# Patient Record
Sex: MALE | Race: WHITE | Employment: OTHER | URBAN - METROPOLITAN AREA
[De-identification: names, ages, dates, MRNs, and addresses within clinical notes are randomized per-mention and may not be internally consistent; named-entity substitution may affect disease eponyms.]

---

## 2021-08-03 ENCOUNTER — PROCEDURE VISIT (OUTPATIENT)
Dept: SURGERY | Age: 71
End: 2021-08-03
Payer: MEDICARE

## 2021-08-03 VITALS — DIASTOLIC BLOOD PRESSURE: 68 MMHG | SYSTOLIC BLOOD PRESSURE: 126 MMHG | HEART RATE: 76 BPM | TEMPERATURE: 98.8 F

## 2021-08-03 DIAGNOSIS — C44.99 SEBACEOUS CARCINOMA: Primary | ICD-10-CM

## 2021-08-03 DIAGNOSIS — C44.399 OTHER SPECIFIED MALIGNANT NEOPLASM OF SKIN OF OTHER PARTS OF FACE: ICD-10-CM

## 2021-08-03 PROCEDURE — 17311 MOHS 1 STAGE H/N/HF/G: CPT | Performed by: DERMATOLOGY

## 2021-08-03 PROCEDURE — 12051 INTMD RPR FACE/MM 2.5 CM/<: CPT | Performed by: DERMATOLOGY

## 2021-08-03 RX ORDER — CHLORAL HYDRATE 500 MG
CAPSULE ORAL DAILY
COMMUNITY

## 2021-08-03 RX ORDER — BRIMONIDINE TARTRATE 2 MG/ML
1 SOLUTION/ DROPS OPHTHALMIC DAILY
COMMUNITY
Start: 2021-07-06

## 2021-08-03 RX ORDER — ATORVASTATIN CALCIUM 20 MG/1
TABLET, FILM COATED ORAL
COMMUNITY
Start: 2021-02-12

## 2021-08-03 RX ORDER — BUDESONIDE AND FORMOTEROL FUMARATE DIHYDRATE 160; 4.5 UG/1; UG/1
AEROSOL RESPIRATORY (INHALATION)
COMMUNITY
Start: 2020-10-20

## 2021-08-03 RX ORDER — ALBUTEROL SULFATE 90 UG/1
2 AEROSOL, METERED RESPIRATORY (INHALATION) EVERY 6 HOURS PRN
COMMUNITY
Start: 2021-03-01

## 2021-08-03 RX ORDER — ASPIRIN 81 MG/1
81 TABLET ORAL DAILY
COMMUNITY

## 2021-08-03 RX ORDER — CALCIUM CARBONATE 260MG(650)
TABLET,CHEWABLE ORAL DAILY
COMMUNITY

## 2021-08-03 NOTE — PATIENT INSTRUCTIONS
[facility] Office Hours:    Monday-Thursday  7:30 AM-4:30 PM    Friday  9:00 AM-1:00 PM    POST-OPERATIVE CARE FOR DISSOLVING STICHES  Bandage change after 48 hours    During your procedure today, dissolving sutures, or stiches, were used to close the wound. You do not have to have stiches removed. They will dissolve (melt away) on their own. Please follow these instructions to help you recover from your procedure and help your wound heal.    CARING FOR YOUR SURGICAL SITE  The bandage should remain on and completely dry for 48 hours. Do NOT get the bandage wet. 1. After the first 48 hours, gently remove the remaining part of the bandage. It can be helpful to moisten the bandage edges in the shower. Steri strips may still be on the wound. It is ok, they will fall off slowly with the daily bandage changes. 2. Gently clean on and around the wound daily with mild soap and water. Some water is okay, but remember the more water on them, the quicker they dissolve! 3. Dry (pat) the area with a clean Q-tip or gauze. Try to clean off any debris or crust from the area. 4. Apply a layer of Vaseline/ Aquaphor (or Bacitracin if your doctor recommends) to the wound area only. 5. Cut a piece of Telfa (or any non-stick dressing) to fit just over the wound and secure it with paper tape. If the wound is small you may use a Band- Aid. Keep area covered for a total of 1 week(s). If the dressing comes off or if you have questions, or concerns about the dressing, please call the office for instructions! POST OPERATIVE INSTRUCTIONS    1. Activity: Do not lift anything heavier than a gallon of milk for 1 week. Also, avoid strenuous activity such as running, power walking or contact sports. 2. Eating and drinking: Do not drink alcohol for 48 hours after your procedure. Alcohol increases the chances of bleeding. 3. Medicines   -If you have discomfort, take Acetaminophen (Tylenol or Extra Strength Tylenol). Follow the instructions and warning on the bottle. -If your doctor has prescribed you an Aspirin daily, please keep taking it. Do not take extra Aspirin or medicines containing Aspirin (such as [place] and Excedrin) for 48 hours after your procedure. Bleeding: If bleeding occurs, DO NOT remove the bandage. Put firm pressure on the area with gauze for 20 minutes without peeking. If the bleeding continues, apply pressure for another 20 minutes. If the bleeding does not stop after you apply pressure, call us right away. If you can not call, go to the nearest emergency room or urgent care facility. What to expect:  You may have these symptoms. They are normal and should get better with time:  1. Swelling. Swelling usually increases for the first 48 hours after your procedure and then begins to improve. Some soreness and redness around your wound. If we worked close to your eyes (forehead, nose, temple, or upper cheeks) your eyes may become swollen and/ or black and blue. 2. Bruising, which could last 1 week or more. 3. Pink and bumpy appearance to the scar. This may happen a few weeks after your procedure. After 4 weeks, you may gently massage the area each day with facial moisturizer or petroleum jelly (Vaseline or Aquaphor). This will help to smooth the skin and improve the appearance of the scar. The color of your scar will fade over time, but it may be pink for several months after the procedure. The scar may take 6 months to 1 year to reach its final color and appearance. 4. \"Spitting\" suture. Occasionally, an inside suture (stitch) does not completely dissolve. When this happens, (generally 4-8 weeks after surgery), it causes a bump or \"pimple\" to form on the scar. This is easily removed and is not at all serious. It does not mean the skin cancer has returned. Contact us if it happens, but do not be alarmed. Vitamin E oil is NOT necessary. A good moisturizer is just as effective.    Sunscreen

## 2021-08-03 NOTE — PROGRESS NOTES
MOHS PROCEDURE NOTE    PHYSICIAN:  Jeffy Domingo MD    ASSISTANT: Loi Solares RN, Amy Spears RN    REFERRING PROVIDER:  Lilli Hay MD    PREOPERATIVE DIAGNOSIS: sebaceous carcinoma     SPECIFIC MOHS INDICATIONS:  location    AUC SCORIN/9    POSTOPERATIVE DIAGNOSIS: SAME    LOCATION: Right central forehead    OPERATIVE PROCEDURE:  MOHS MICROGRAPHIC SURGERY    RECONSTRUCTION OF DEFECT: Intermediate layered closure    PREOPERATIVE SIZE: 6 x 6 MM    DEFECT SIZE: 10 x 10 MM    LENGTH OF REPAIRED WOUND/SIZE OF FLAP/SIZE OF GRAFT:  24 MM    ANESTHESIA:  4mL 1% lidocaine with epinephrine 1:100,000 buffered. EBL:  MINIMAL    DURATION OF PROCEDURE:  1 HOUR    POSTOPERATIVE OBSERVATION: 1 HOUR    SPECIMENS:  SEE MOHS MAP    COMPLICATIONS:  NONE    DESCRIPTION OF PROCEDURE:  The patient was given a mirror, as appropriate, and the biopsy site was identified, marked with a surgical marking pen, and verified by the patient. Options for treatment were discussed and the patient was informed that Mohs surgery was the selected treatment based on its lower recurrence rate, given the features listed above, as compared to other treatment modalities such as excision, radiation, or curettage, and agreed with this treatment plan. Risks and benefits including bruising, swelling, bleeding, infection, nerve injury, recurrence, and scarring were discussed with the patient prior to the procedure and a written consent detailing these and other risks was reviewed with the patient and signed. There was a time out for person and procedure verification. The surgical site was prepped with an antiseptic solution. Application of an antiseptic solution was repeated before each surgical stage. Stage I:  The clinically-apparent tumor was carefully defined and debulked, determining the edge of the surgical excision.     A thin layer of tumor-laden tissue was excised with a narrow margin of normal-appearing skin, using the technique of Mohs. A map was prepared to correspond to the area of skin from which it was excised. Hemostasis was achieved using electrosurgery. The wound was bandaged. The tissue was prepared for the cryostat and sectioned. 1 section(s) prepared. Each section was coded, cut, and stained for microscopic examination. The entire base and margins of the excised piece of tissue were examined by the surgeon. The tissue was examined to the level of subcutaneous fat. No tumor was identified at the peripheral margins of stage I of microscopically controlled surgery. DEFECT MANAGEMENT:    REPAIR DESCRIPTION:  Various closure modalities were discussed with the patient, and it was decided that an intermediate layered repair would best preserve normal anatomic and functional relationships. Additional risk of wound dehiscence was discussed. The area was anesthetized with 1% lidocaine with epinephrine 1:100,000 buffered, was given a sterile prep using Chlorhexidine gluconate 4% solution and draped in the usual sterile fashion. Recreation and enlargement of the wound was performed by excising cones of tissue via the triangulation technique. The final incision lines were placed with respect for the patient's natural skin tension lines in a linear configuration to avoid functional and aesthetic distortion of adjacent free margins. Following minimal undermining, meticulous hemostasis was obtained with spot monopolar electrocoagulation. Subcutaneous dead space and dermis were closed using 5-0 Vicryl buried subcutaneous interrupted suture and the epidermis was approximated with 5-0 Fast absorbing gut running epidermal sutures. WOUND COVERAGE:  The wound was cleaned with normal saline solution, dried off, Aquaphor ointment was applied, and the wound was covered. A dressing was applied for stabilization and light pressure.   The patient was given detailed oral and written instructions on postoperative care. There were no complications. The patient left the Unit in good medical condition. FOLLOW-UP:  As dissolving sutures were placed, the patient was asked to return if any questions or concerns arose, but otherwise will return to see general dermatology per their instructions. Pt with sister with colon polyps, pt had recent colonoscopy with normal findings.

## 2021-08-04 ENCOUNTER — TELEPHONE (OUTPATIENT)
Dept: SURGERY | Age: 71
End: 2021-08-04

## 2021-08-04 NOTE — TELEPHONE ENCOUNTER
The patient was in the office on 8/3/21 for Mohs located on the Right Central Forehead with [facility] repair. The patient tolerated the procedure well and left the office in good condition. Pain level on post-operative day 1:  none and level of pain (1-10, 10 severe), is taking tylenol intermittently for pain    Any bleeding episode that required pressure to be held, bandage change or a call to the office or MD?  no     Any other issues?:  no    A post-operative telephone call was placed at 8/4/2021 at 3:27 PM   in order to check on the patient's recovery process. The patient reported doing well and had no complaints other than those listed above, if any. All of the patient's questions were answered.

## 2022-02-18 RX ORDER — LATANOPROST 50 UG/ML
1 SOLUTION/ DROPS OPHTHALMIC NIGHTLY
COMMUNITY

## 2022-02-18 NOTE — PROGRESS NOTES
Obstructive Sleep Apnea (MOIZ) Screening     Patient:  Radames Song    YOB: 1950      Medical Record #:  [id number]                     Date:  2/18/2022     1. Are you a loud and/or regular snorer? []  Yes       [] No    2. Have you been observed to gasp or stop breathing during sleep? []  Yes       [] No    3. Do you feel tired or groggy upon awakening or do you awaken with a headache?           []  Yes       [] No    4. Are you often tired or fatigued during the wake time hours? []  Yes       [] No    5. Do you fall asleep sitting, reading, watching TV or driving? []  Yes       [] No    6. Do you often have problems with memory or concentration? []  Yes       [] No    **If patient's score is ? 3 they are considered high risk for MOIZ. An Anesthesia provider will evaluate the patient and develop a plan of care the day of surgery. Note:  If the patient's BMI is more than 35 kg m¯² , has neck circumference > 40 cm, and/or high blood pressure the risk is greater (© American Sleep Apnea Association, 2006).

## 2022-02-23 ENCOUNTER — HOSPITAL ENCOUNTER (OUTPATIENT)
Age: 72
Setting detail: OUTPATIENT SURGERY
Discharge: HOME | End: 2022-02-23
Attending: INTERNAL MEDICINE | Admitting: INTERNAL MEDICINE
Payer: MEDICARE

## 2022-02-23 ENCOUNTER — ANESTHESIA EVENT (OUTPATIENT)
Dept: ENDOSCOPY | Age: 72
End: 2022-02-23
Payer: MEDICARE

## 2022-02-23 ENCOUNTER — ANESTHESIA (OUTPATIENT)
Dept: ENDOSCOPY | Age: 72
End: 2022-02-23
Payer: MEDICARE

## 2022-02-23 VITALS
WEIGHT: 270 LBS | OXYGEN SATURATION: 96 % | DIASTOLIC BLOOD PRESSURE: 41 MMHG | SYSTOLIC BLOOD PRESSURE: 105 MMHG | TEMPERATURE: 98.5 F | RESPIRATION RATE: 16 BRPM | HEART RATE: 75 BPM | BODY MASS INDEX: 42.38 KG/M2 | HEIGHT: 67 IN

## 2022-02-23 VITALS — OXYGEN SATURATION: 89 % | DIASTOLIC BLOOD PRESSURE: 45 MMHG | SYSTOLIC BLOOD PRESSURE: 91 MMHG

## 2022-02-23 PROCEDURE — 3700000000 HC ANESTHESIA ATTENDED CARE: Performed by: INTERNAL MEDICINE

## 2022-02-23 PROCEDURE — 2580000003 HC RX 258: Performed by: ANESTHESIOLOGY

## 2022-02-23 PROCEDURE — 6360000002 HC RX W HCPCS: Performed by: NURSE ANESTHETIST, CERTIFIED REGISTERED

## 2022-02-23 PROCEDURE — 7100000011 HC PHASE II RECOVERY - ADDTL 15 MIN: Performed by: INTERNAL MEDICINE

## 2022-02-23 PROCEDURE — 2500000003 HC RX 250 WO HCPCS: Performed by: NURSE ANESTHETIST, CERTIFIED REGISTERED

## 2022-02-23 PROCEDURE — 2709999900 HC NON-CHARGEABLE SUPPLY: Performed by: INTERNAL MEDICINE

## 2022-02-23 PROCEDURE — 7100000010 HC PHASE II RECOVERY - FIRST 15 MIN: Performed by: INTERNAL MEDICINE

## 2022-02-23 PROCEDURE — 2580000003 HC RX 258: Performed by: INTERNAL MEDICINE

## 2022-02-23 PROCEDURE — 3700000001 HC ADD 15 MINUTES (ANESTHESIA): Performed by: INTERNAL MEDICINE

## 2022-02-23 PROCEDURE — 3609017100 HC EGD: Performed by: INTERNAL MEDICINE

## 2022-02-23 RX ORDER — SODIUM CHLORIDE 9 MG/ML
25 INJECTION, SOLUTION INTRAVENOUS PRN
Status: DISCONTINUED | OUTPATIENT
Start: 2022-02-23 | End: 2022-02-23 | Stop reason: HOSPADM

## 2022-02-23 RX ORDER — SODIUM CHLORIDE 0.9 % (FLUSH) 0.9 %
5-40 SYRINGE (ML) INJECTION PRN
Status: DISCONTINUED | OUTPATIENT
Start: 2022-02-23 | End: 2022-02-23 | Stop reason: HOSPADM

## 2022-02-23 RX ORDER — LIDOCAINE HYDROCHLORIDE 10 MG/ML
0.1 INJECTION, SOLUTION EPIDURAL; INFILTRATION; INTRACAUDAL; PERINEURAL
Status: DISCONTINUED | OUTPATIENT
Start: 2022-02-23 | End: 2022-02-23 | Stop reason: HOSPADM

## 2022-02-23 RX ORDER — SODIUM CHLORIDE 0.9 % (FLUSH) 0.9 %
5-40 SYRINGE (ML) INJECTION EVERY 12 HOURS SCHEDULED
Status: DISCONTINUED | OUTPATIENT
Start: 2022-02-23 | End: 2022-02-23 | Stop reason: HOSPADM

## 2022-02-23 RX ORDER — LIDOCAINE HYDROCHLORIDE 20 MG/ML
INJECTION, SOLUTION EPIDURAL; INFILTRATION; INTRACAUDAL; PERINEURAL PRN
Status: DISCONTINUED | OUTPATIENT
Start: 2022-02-23 | End: 2022-02-23 | Stop reason: SDUPTHER

## 2022-02-23 RX ORDER — PROPOFOL 10 MG/ML
INJECTION, EMULSION INTRAVENOUS PRN
Status: DISCONTINUED | OUTPATIENT
Start: 2022-02-23 | End: 2022-02-23 | Stop reason: SDUPTHER

## 2022-02-23 RX ORDER — SODIUM CHLORIDE, SODIUM LACTATE, POTASSIUM CHLORIDE, CALCIUM CHLORIDE 600; 310; 30; 20 MG/100ML; MG/100ML; MG/100ML; MG/100ML
INJECTION, SOLUTION INTRAVENOUS ONCE
Status: COMPLETED | OUTPATIENT
Start: 2022-02-23 | End: 2022-02-23

## 2022-02-23 RX ORDER — LIDOCAINE HYDROCHLORIDE 10 MG/ML
0.3 INJECTION, SOLUTION EPIDURAL; INFILTRATION; INTRACAUDAL; PERINEURAL
Status: DISCONTINUED | OUTPATIENT
Start: 2022-02-23 | End: 2022-02-23 | Stop reason: HOSPADM

## 2022-02-23 RX ORDER — PHENYLEPHRINE HCL IN 0.9% NACL 1 MG/10 ML
SYRINGE (ML) INTRAVENOUS PRN
Status: DISCONTINUED | OUTPATIENT
Start: 2022-02-23 | End: 2022-02-23 | Stop reason: SDUPTHER

## 2022-02-23 RX ORDER — SODIUM CHLORIDE, SODIUM LACTATE, POTASSIUM CHLORIDE, CALCIUM CHLORIDE 600; 310; 30; 20 MG/100ML; MG/100ML; MG/100ML; MG/100ML
INJECTION, SOLUTION INTRAVENOUS CONTINUOUS
Status: DISCONTINUED | OUTPATIENT
Start: 2022-02-23 | End: 2022-02-23 | Stop reason: HOSPADM

## 2022-02-23 RX ADMIN — PROPOFOL 30 MG: 10 INJECTION, EMULSION INTRAVENOUS at 12:27

## 2022-02-23 RX ADMIN — SODIUM CHLORIDE, POTASSIUM CHLORIDE, SODIUM LACTATE AND CALCIUM CHLORIDE: 600; 310; 30; 20 INJECTION, SOLUTION INTRAVENOUS at 11:48

## 2022-02-23 RX ADMIN — PROPOFOL 70 MG: 10 INJECTION, EMULSION INTRAVENOUS at 12:22

## 2022-02-23 RX ADMIN — Medication 100 MCG: at 12:29

## 2022-02-23 RX ADMIN — SODIUM CHLORIDE, SODIUM LACTATE, POTASSIUM CHLORIDE, AND CALCIUM CHLORIDE: .6; .31; .03; .02 INJECTION, SOLUTION INTRAVENOUS at 12:16

## 2022-02-23 RX ADMIN — LIDOCAINE HYDROCHLORIDE 60 MG: 20 INJECTION, SOLUTION EPIDURAL; INFILTRATION; INTRACAUDAL; PERINEURAL at 12:22

## 2022-02-23 ASSESSMENT — PAIN - FUNCTIONAL ASSESSMENT
PAIN_FUNCTIONAL_ASSESSMENT: 0-10
PAIN_FUNCTIONAL_ASSESSMENT: PREVENTS OR INTERFERES WITH MANY ACTIVE NOT PASSIVE ACTIVITIES

## 2022-02-23 NOTE — H&P
[address]   Pre-operative History and Physical    Patient: Haris Rubio  : 1950  Acct#:     HISTORY OF PRESENT ILLNESS:    The patient is a 66 y.o. male who presents for black stools with hx of peptic ulcer history of significant lung disease on O2. We discussed the increased risk, will use POM mask     Indications: black stools     Past Medical History:        Diagnosis Date    Acid reflux     Arthritis     Asthma     COPD (chronic obstructive pulmonary disease) (HCC)     Hypertension     Peptic ulcer     Scarring of lung     2/3 right lung scarred close    Sleep apnea     uses CPAP      Past Surgical History:        Procedure Laterality Date    APPENDECTOMY      CHOLECYSTECTOMY      GASTRIC BYPASS SURGERY      SPLENECTOMY        Medications Prior to Admission:   No current facility-administered medications on file prior to encounter. Current Outpatient Medications on File Prior to Encounter   Medication Sig Dispense Refill    latanoprost (XALATAN) 0.005 % ophthalmic solution Place 1 drop into both eyes nightly      aspirin 81 MG EC tablet Take 81 mg by mouth daily      Magnesium Citrate 100 MG TABS Take by mouth daily       Omega-3 Fatty Acids (FISH OIL) 1000 MG CAPS Take by mouth daily      albuterol sulfate  (90 Base) MCG/ACT inhaler Inhale 2 puffs into the lungs every 6 hours as needed      atorvastatin (LIPITOR) 20 MG tablet TAKE ONE TABLET BY MOUTH DAILY      brimonidine (ALPHAGAN) 0.2 % ophthalmic solution Place 1 drop into both eyes daily       budesonide-formoterol (SYMBICORT) 160-4.5 MCG/ACT AERO INHALE TWO PUFFS BY MOUTH TWICE A DAY      omeprazole (PRILOSEC) 10 MG capsule Take 10 mg by mouth daily.  cetirizine (ZYRTEC) 10 MG tablet Take 10 mg by mouth daily.  zolpidem (AMBIEN) 10 MG tablet Take 10 mg by mouth nightly as needed.  furosemide (LASIX) 20 MG tablet Take 20 mg by mouth daily.         potassium chloride (KLOR-CON) 10 MEQ CR tablet Take 10 mEq by mouth daily.  amlodipine (NORVASC) 5 MG tablet Take 5 mg by mouth daily.  oxycodone-acetaminophen (PERCOCET) 5-325 MG per tablet Take 2 tablets by mouth every 6 hours as needed. Allergies:  Dairycare [lactase-lactobacillus], Ciprofloxacin, Sudafed [pseudoephedrine hcl], and Sulfa antibiotics    Social History:   Social History     Socioeconomic History    Marital status:      Spouse name: Not on file    Number of children: Not on file    Years of education: Not on file    Highest education level: Not on file   Occupational History    Not on file   Tobacco Use    Smoking status: Former Smoker     Types: Pipe     Quit date:      Years since quittin.1    Smokeless tobacco: Never Used   Substance and Sexual Activity    Alcohol use: Not Currently    Drug use: Never    Sexual activity: Not on file   Other Topics Concern    Not on file   Social History Narrative    Not on file     Social Determinants of Health     Financial Resource Strain:     Difficulty of Paying Living Expenses: Not on file   Food Insecurity:     Worried About [address] in the Last Year: Not on file    Fe of Food in the Last Year: Not on file   Transportation Needs:     Lack of Transportation (Medical): Not on file    Lack of Transportation (Non-Medical):  Not on file   Physical Activity:     Days of Exercise per Week: Not on file    Minutes of Exercise per Session: Not on file   Stress:     Feeling of Stress : Not on file   Social Connections:     Frequency of Communication with Friends and Family: Not on file    Frequency of Social Gatherings with Friends and Family: Not on file    Attends Evangelical Services: Not on file    Active Member of Clubs or Organizations: Not on file    Attends Club or Organization Meetings: Not on file    Marital Status: Not on file   Intimate Partner Violence:     Fear of Current or Ex-Partner: Not on file   Alicia

## 2022-02-23 NOTE — ANESTHESIA POSTPROCEDURE EVALUATION
Department of Anesthesiology  Postprocedure Note    Patient: Haris Rubio  MRN: [id number]  YOB: 1950  Date of evaluation: 2/23/2022  Time:  1:10 PM     Procedure Summary     Date: 02/23/22 [facility] [address]    Anesthesia Start: 1216 Anesthesia Stop: 1219    Procedure: ESOPHAGOGASTRODUODENOSCOPY -SLEEP APNEA (N/A ) Diagnosis:       Melena      (MELENA)    Surgeons: Shannon St MD Responsible Provider: Alba Merlin, MD    Anesthesia Type: general ASA Status: 3          Anesthesia Type: general    Toni Phase I: Toni Score: 10    Toni Phase II: Toni Score: 6    Last vitals: Reviewed and per EMR flowsheets.      Vitals:    02/23/22 1238 02/23/22 1241 02/23/22 1246 02/23/22 1300   BP: (!) 105/46 (!) 104/47 (!) 101/43 (!) 105/41   Pulse: 89 78 79 75   Resp: 16 16 16 16   Temp:       TempSrc:       SpO2: 93% 93% 96% 96%   Weight:       Height:         Anesthesia Post Evaluation    Patient location during evaluation: bedside  Patient participation: complete - patient participated  Level of consciousness: awake and alert  Airway patency: patent  Nausea & Vomiting: no nausea  Complications: no  Cardiovascular status: hemodynamically stable  Respiratory status: acceptable  Hydration status: euvolemic

## 2022-02-23 NOTE — ANESTHESIA PRE PROCEDURE
by mouth every 6 hours as needed. Historical Provider, MD       Current medications:    Current Facility-Administered Medications   Medication Dose Route Frequency Provider Last Rate Last Admin    lactated ringers infusion   IntraVENous Once Yosef Steele MD        lidocaine PF 1 % injection 0.1 mL  0.1 mL IntraDERmal Once PRN Yosef Steele MD        lidocaine PF 1 % injection 0.3 mL  0.3 mL IntraDERmal Once PRN Marquise Bundy MD        lactated ringers infusion   IntraVENous Continuous Marquise Bundy MD        sodium chloride flush 0.9 % injection 5-40 mL  5-40 mL IntraVENous 2 times per day Marquise Bundy MD        sodium chloride flush 0.9 % injection 5-40 mL  5-40 mL IntraVENous PRN Marquise Bundy MD        0.9 % sodium chloride infusion  25 mL IntraVENous PRN Marquise Bundy MD         Current Outpatient Medications   Medication Sig Dispense Refill    latanoprost (XALATAN) 0.005 % ophthalmic solution Place 1 drop into both eyes nightly      aspirin 81 MG EC tablet Take 81 mg by mouth daily      Magnesium Citrate 100 MG TABS Take by mouth daily       Omega-3 Fatty Acids (FISH OIL) 1000 MG CAPS Take by mouth daily      albuterol sulfate  (90 Base) MCG/ACT inhaler Inhale 2 puffs into the lungs every 6 hours as needed      atorvastatin (LIPITOR) 20 MG tablet TAKE ONE TABLET BY MOUTH DAILY      brimonidine (ALPHAGAN) 0.2 % ophthalmic solution Place 1 drop into both eyes daily       budesonide-formoterol (SYMBICORT) 160-4.5 MCG/ACT AERO INHALE TWO PUFFS BY MOUTH TWICE A DAY      omeprazole (PRILOSEC) 10 MG capsule Take 10 mg by mouth daily.  cetirizine (ZYRTEC) 10 MG tablet Take 10 mg by mouth daily.  zolpidem (AMBIEN) 10 MG tablet Take 10 mg by mouth nightly as needed.  furosemide (LASIX) 20 MG tablet Take 20 mg by mouth daily.  potassium chloride (KLOR-CON) 10 MEQ CR tablet Take 10 mEq by mouth daily.         amlodipine (NORVASC) 5 MG tablet Take 5 mg by mouth daily.  oxycodone-acetaminophen (PERCOCET) 5-325 MG per tablet Take 2 tablets by mouth every 6 hours as needed. Allergies: Allergies   Allergen Reactions    Dairycare [Lactase-Lactobacillus]     Ciprofloxacin Rash    Sudafed [Pseudoephedrine Hcl] Rash    Sulfa Antibiotics Rash       Problem List:  There is no problem list on file for this patient. Past Medical History:        Diagnosis Date    Acid reflux     Arthritis     Asthma     COPD (chronic obstructive pulmonary disease) (HCC)     Hypertension     Peptic ulcer     Scarring of lung     2/3 right lung scarred close    Sleep apnea     uses CPAP       Past Surgical History:        Procedure Laterality Date    APPENDECTOMY      CHOLECYSTECTOMY      GASTRIC BYPASS SURGERY      SPLENECTOMY         Social History:    Social History     Tobacco Use    Smoking status: Former Smoker     Types: Pipe     Quit date:      Years since quittin.1    Smokeless tobacco: Never Used   Substance Use Topics    Alcohol use: Not Currently                                Counseling given: Not Answered      Vital Signs (Current):   Vitals:    22 1456   Weight: 270 lb (122.5 kg)   Height: 5' 7\" (1.702 m)                                              BP Readings from Last 3 Encounters:   21 126/68   11 120/70       NPO Status:                                                                                 BMI:   Wt Readings from Last 3 Encounters:   11 274 lb (124.3 kg)     Body mass index is 42.29 kg/m². CBC: No results found for: WBC, RBC, HGB, HCT, MCV, RDW, PLT    CMP: No results found for: NA, K, CL, CO2, BUN, CREATININE, GFRAA, AGRATIO, LABGLOM, GLUCOSE, GLU, PROT, CALCIUM, BILITOT, ALKPHOS, AST, ALT    POC Tests: No results for input(s): POCGLU, POCNA, POCK, POCCL, POCBUN, POCHEMO, POCHCT in the last 72 hours. Coags: No results found for: PROTIME, INR, APTT    HCG (If Applicable):  No results found for: PREGTESTUR, PREGSERUM, HCG, HCGQUANT     ABGs: No results found for: PHART, PO2ART, EAM7UXY, IZN8XRX, BEART, O1LPJLAM     Type & Screen (If Applicable):  No results found for: LABABO, LABRH    Drug/Infectious Status (If Applicable):  No results found for: HIV, HEPCAB    COVID-19 Screening (If Applicable): No results found for: COVID19        Anesthesia Evaluation  Patient summary reviewed and Nursing notes reviewed no history of anesthetic complications:   Airway: Mallampati: III     Neck ROM: full   Dental:          Pulmonary:Negative Pulmonary ROS and normal exam    (+) COPD:  sleep apnea:  asthma:                           ROS comment: Lung scaring post pneumonia   Cardiovascular:Negative CV ROS    (+) hypertension:,                   Neuro/Psych:   Negative Neuro/Psych ROS              GI/Hepatic/Renal: Neg GI/Hepatic/Renal ROS  (+) GERD: well controlled, PUD,      (-) hiatal hernia       Endo/Other: Negative Endo/Other ROS   (+) : arthritis:., .                 Abdominal:             Vascular: Other Findings:           Anesthesia Plan      general     ASA 3     (I discussed with the patient the risks and benefits of PIV, general anesthesia, IV Narcotics, PACU. All questions were answered the patient agrees with the plan and wishes to proceed.  )  Induction: intravenous. Pre-Operative Diagnosis: Melena [K92.1]    66 y.o.   BMI:  Body mass index is 42.29 kg/m².      Vitals:    22 1456 22 1136   BP:  105/60   Pulse:  67   Resp:  20   Temp:  98.5 °F (36.9 °C)   TempSrc:  Temporal   SpO2:  100%   Weight: 270 lb (122.5 kg)    Height: 5' 7\" (1.702 m)        Allergies   Allergen Reactions    Dairycare [Lactase-Lactobacillus]     Ciprofloxacin Rash    Sudafed [Pseudoephedrine Hcl] Rash    Sulfa Antibiotics Rash       Social History     Tobacco Use    Smoking status: Former Smoker     Types: Pipe     Quit date:      Years since quittin.1    Smokeless tobacco: Never Used   Substance Use Topics    Alcohol use: Not Currently       LABS:    CBC  No results found for: WBC, HGB, HCT, PLT  RENAL  No results found for: NA, K, CL, CO2, BUN, CREATININE, GLUCOSE  COAGS  No results found for: PROTIME, INR, APTT      Aparna Greene MD   2/23/2022

## 2022-02-23 NOTE — PROCEDURES
Endoscopy Note    Patient: Percy Valadez  : 1950      Procedure: Esophagogastroduodenoscopy     Date:  2022     Surgeon:  Maranda Herrera MD     Referring Physician:  Noni Pike MD      History:  The patient is a 66 y.o. male who presents for black stools with hx of peptic ulcer history of significant lung disease on O2. We discussed the increased risk, will use POM mask        INDICATION: Black stools     ASA: 4  SEDATION: MAC         Operative Surgeon: Maranda Herrera MD  Scope Type: Gastroscope      Preoperative Diagnosis: Black stools  Postoperative Diagnosis: History of gastric bypass    Procedure Performed: EGD    Procedure Details:    With the patient in left lateral position the endoscope was passed through the hypopharynx into the esophagus. The scope as then passed through the esophagus to the small bowel. All visualized portions were carefully inspected. The gastric air was suctioned and the scope as removed. Patient tolerated the procedure well. Findings and maneuvers are discussed below. Complications:  None  Estimated Blood Loss: minimal to none    Post Operative Findings:   Esophagus: Subcu mucosa was normal throughout the entire esophagus  Stomach: Evidence of prior gastric bypass the gastric pouch appeared normal and healthy. No anastomotic ulcers were seen. Small bowel: Small bowel limb was deeply intubated there was no blood or other lesions seen    Plan: Follow-up with primary gastroenterologist no obvious signs of GI blood loss at this time. He is status post gastric bypass. Continue acid suppression for now. Signed By: MD Maranda Potts MD,   [facility]  [phone]    Please note that some or all of this record was generated using voice recognition software. If there are any questions about the content of this document, please contact the author as some errors in translation may have occurred.

## 2023-01-18 ENCOUNTER — TELEPHONE (OUTPATIENT)
Dept: ORTHOPEDIC SURGERY | Age: 73
End: 2023-01-18

## 2023-01-18 NOTE — TELEPHONE ENCOUNTER
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, MOIZ on CPAP, HTN, RBBB, BPH, Iron deficiency anemia, Esophageal reflux, Chronic pain syndrome    Critical Lab Values  - Hemoglobin (g/dL):  Date: 01/17/23 Value 13.4  - Hematocrit(%): Date: 01/17/23  Value 42.3  - HgbA1C:  Date:  Value  - Albumin:  Date: 01/17/23  Value 3.6  - BUN:  Date:  01/17/23 Value 19  - Creatinine:  Date: 01/17/23  Value 0.89    Coronary Artery Disease/HTN/CHF history: Yes  Cardiologist: No  Cardiac clearance necessary:  No  Date of cardiac clearance appt:  Final Cardiac recommendations: On any anticoagulation: Aspirin 81mg QD    Diabetes History:  No  Most recent HgbA1C: 4.9 on 07/19/22  Pulmonary:  COPD/Emphysema/Use of home oxygen: Yes, COPD  Alcohol use: Yes    BMI greater than 40 at time of scheduling: Additional medical concerns:   Additional recommendations for above concerns:  Attended Pre-Hab program:    Anticipated Discharge Disposition:  Patient does not have any help at home and wants to go to rehab unit PO  Who will be with patient at home following discharge:    Equipment patient already has:    Bedroom on first or second floor:    Bathroom on first or second floor:    Weight bearing status:  wbat  Pre-op ambulatory status: painful ambulation  Number of entry steps:    Caregiver assistance:  Patient does not have anyone to help PO    Boby Peoples RN  Date:  01/18/23

## 2023-01-20 RX ORDER — FLUTICASONE PROPIONATE 50 MCG
SPRAY, SUSPENSION (ML) NASAL
COMMUNITY
Start: 2022-12-02

## 2023-01-20 RX ORDER — OXYCODONE AND ACETAMINOPHEN 10; 325 MG/1; MG/1
TABLET ORAL
Status: ON HOLD | COMMUNITY
Start: 2022-11-18 | End: 2023-01-27 | Stop reason: HOSPADM

## 2023-01-20 RX ORDER — IPRATROPIUM BROMIDE 21 UG/1
2 SPRAY, METERED NASAL 3 TIMES DAILY
COMMUNITY
Start: 2022-11-29

## 2023-01-20 RX ORDER — SULINDAC 150 MG/1
TABLET ORAL
COMMUNITY
Start: 2023-01-17

## 2023-01-20 RX ORDER — GABAPENTIN 400 MG/1
CAPSULE ORAL 2 TIMES DAILY
Status: ON HOLD | COMMUNITY
Start: 2022-12-02 | End: 2023-01-27 | Stop reason: SDUPTHER

## 2023-01-20 RX ORDER — CEFADROXIL 500 MG/1
CAPSULE ORAL
COMMUNITY
Start: 2023-01-18

## 2023-01-20 NOTE — PROGRESS NOTES
[facility] PRE-SURGICAL TESTING INSTRUCTIONS                      PRIOR TO PROCEDURE DATE:    1. PLEASE FOLLOW ANY INSTRUCTIONS GIVEN TO YOU PER YOUR SURGEON. 2. Arrange for someone to drive you home and be with you for the first 24 hours after discharge for your safety after your procedure for which you received sedation. Ensure it is someone we can share information with regarding your discharge. NOTE: At this time ONLY 2 ADULTS may accompany you   One person ENCOURAGED to stay at hospital entire time if outpatient surgery      3. You must contact your surgeon for instructions IF:  You are taking any blood thinners, aspirin, anti-inflammatory or vitamins. There is a change in your physical condition such as a cold, fever, rash, cuts, sores, or any other infection, especially near your surgical site. 4. Do not drink alcohol the day before or day of your procedure. Do not use any recreational marijuana at least 24 hours or street drugs (heroin, cocaine) at minimum 5 days prior to your procedure. 5. A Pre-Surgical History and Physical MUST be completed WITHIN 30 DAYS OR LESS prior to your procedure. by your Physician or an Urgent Care        THE DAY OF YOUR PROCEDURE:  1. Follow instructions for ARRIVAL TIME as DIRECTED BY YOUR SURGEON. 2. Enter the MAIN entrance from [employer] and follow the signs to the free Parking [employer] or [employer] (offered free of charge 7 am-5pm). 3. Enter the Main Entrance of the hospital (do not enter from the lower level of the parking garage). Upon entrance, check in with the  at the surgical information desk on your LEFT. Bring your insurance card and photo ID to register      4. DO NOT EAT ANYTHING 8 hours prior to arrival for surgery. You may have up to 8 ounces of water 4 hours prior to your arrival for surgery.    NOTE: ALL Gastric, Bariatric & Bowel surgery patients - you MUST follow your surgeon's instructions regarding eating/ drinking as you will have very specific instructions to follow. If you did not receive these, call your surgeon's office immediately. 5. MEDICATIONS:  Take the following medications with a SMALL sip of water: amlodipine, nexium, symbicort, bring rescue inhaler  Use your usual dose of inhalers the morning of surgery. BRING your rescue inhaler with you to hospital.   Anesthesia does NOT want you to take insulin the morning of surgery. They will control your blood sugar while you are at the hospital. Please contact your ordering physician for instructions regarding your insulin the night before your procedure. If you have an insulin pump, please keep it set on basal rate. Bariatric patient's call your surgeon if on diabetic medications as some may need to be stopped 1 week prior to surgery    6. Do not swallow additional water when brushing teeth. No gum, candy, mints, or ice chips. Refrain from smoking or at least decrease the amount on day of surgery. 7. Morning of surgery:   Take a shower with an antibacterial soap (i.e., Safeguard or Dial) OR your physician may have instructed you to use Hibiclens. Dress in loose, comfortable clothing appropriate for redressing after your procedure. Do not wear jewelry (including body piercings), make-up (especially NO eye make-up), fingernail polish (NO toenail polish if foot/leg surgery), lotion, powders, or metal hairclips. Do not shave or wax for 72 hours prior to procedure near your operative site. Shaving with a razor can irritate your skin and make it easier to develop an infection. On the day of your procedure, any hair that needs to be removed near the surgical site will be 'clipped' by a healthcare worker using a special clipper designed to avoid skin irritation. 8. Dentures, glasses, or contacts will need to be removed before your procedure.  Bring cases for your glasses, contacts, dentures, or hearing aids to protect them while you are in surgery. 9. If you use a CPAP, please bring it with you on the day of your procedure. 10. We recommend that valuable personal belongings such as cash, cell phones, e-tablets, or jewelry, be left at home during your stay. The hospital will not be responsible for valuables that are not secured in the hospital safe. However, if your insurance requires a co-pay, you may want to bring a method of payment, i.e., Check or credit card, if you wish to pay your co-pay the day of surgery. 11. If you are to stay overnight, you may bring a bag with personal items. Please have any large items you may need brought in by your family after your arrival to your hospital room. 12. If you have a Living Will or Durable Power of , please bring a copy on the day of your procedure. How we keep you safe and work to prevent surgical site infections:   1. Health care workers should always check your ID bracelet to verify your name and birth date. You will be asked many times to state your name, date of birth, and allergies. 2. Health care workers should always clean their hands with soap or alcohol gel before providing care to you. It is okay to ask anyone if they cleaned their hands before they touch you. 3. You will be actively involved in verifying the type of procedure you are having and ensuring the correct surgical site. This will be confirmed multiple times prior to your procedure. Do NOT perri your surgery site UNLESS instructed to by your surgeon. 4. When you are in the operating room, your surgical site will be cleansed with a special soap, and in most cases, you will be given an antibiotic before the surgery begins. What to expect AFTER your procedure? 1. Immediately following your procedure, your will be taken to the PACU for the first phase of your recovery.   Your nurse will help you recover from any potential side effects of anesthesia, such as extreme drowsiness, changes in your vital signs or breathing patterns. Nausea, headache, muscle aches, or sore throat may also occur after anesthesia. Your nurse will help you manage these potential side effects. 2. For comfort and safety, arrange to have someone at home with you for the first 24 hours after discharge. 3. You and your family will be given written instructions about your diet, activity, dressing care, medications, and return visits. 4. Once at home, should issues with nausea, pain, or bleeding occur, or should you notice any signs of infection, you should call your surgeon. 5. Always clean your hands before and after caring for your wound. Do not let your family touch your surgery site without cleaning their hands. 6. Narcotic pain medications can cause significant constipation. You may want to add a stool softener to your postoperative medication schedule or speak to your surgeon on how best to manage this SIDE EFFECT. Thank you for allowing us to care for you. We strive to exceed your expectations in the delivery of care and service provided to you and your family. If you need to contact the [place] [id number] staff for any reason, please call us at [phone]    Instructions reviewed with patient during preadmission testing phone interview.   Andressa Pedroza RN.1/20/2023 .3:52 PM      ADDITIONAL EDUCATIONAL INFORMATION REVIEWED PER PHONE WITH YOU AND/OR YOUR FAMILY:  Yes Hibiclens® Bathing Instructions   No Antibacterial Soap

## 2023-01-20 NOTE — PROGRESS NOTES
Place patient label inside box (if no patient label, complete below)  Name:  :  MR#:   Arsh Hawthorne / PROCEDURE  I (we), Felix Lee (Patient Name) authorize DR. Willian Medina (Provider / Grecia Miller) and/or such assistants as may be selected by him/her, to perform the following operation/procedure(s): MINIMALLY INVASIVE LEFT TOTAL KNEE ARTHROPLASTY       Note: If unable to obtain consent prior to an emergent procedure, document the emergent reason in the medical record. This procedure has been explained to my (our) satisfaction and included in the explanation was: The intended benefit, nature, and extent of the procedure to be performed; The significant risks involved and the probability of success; Alternative procedures and methods of treatment; The dangers and probable consequences of such alternatives (including no procedure or treatment); The expected consequences of the procedure on my future health; Whether other qualified individuals would be performing important surgical tasks and/or whether  would be present to advise or support the procedure. I (we) understand that there are other risks of infection and other serious complications in the pre-operative/procedural and postoperative/procedural stages of my (our) care. I (we) have asked all of the questions which I (we) thought were important in deciding whether or not to undergo treatment or diagnosis. These questions have been answered to my (our) satisfaction. I (we) understand that no assurance can be given that the procedure will be a success, and no guarantee or warranty of success has been given to me (us). It has been explained to me (us) that during the course of the operation/procedure, unforeseen conditions may be revealed that necessitate extension of the original procedure(s) or different procedure(s) than those set forth in Paragraph 1.  I (we) authorize and request that the above-named physician, his/her assistants or his/her designees, perform procedures as necessary and desirable if deemed to be in my (our) best interest.     Revised 8/2/2021                                                                          Page 1 of 2       I acknowledge that health care personnel may be observing this procedure for the purpose of medical education or other specified purposes as may be necessary as requested and/or approved by my (our) physician. I (we) consent to the disposal by the hospital Pathologist of the removed tissue, parts or organs in accordance with hospital policy. I do ____ do not ____ consent to the use of a local infiltration pain blocking agent that will be used by my provider/surgical provider to help alleviate pain during my procedure. I do ____ do not ____ consent to an emergent blood transfusion in the case of a life-threatening situation that requires blood components to be administered. This consent is valid for 24 hours from the beginning of the procedure. This patient does ____ or does not ____ currently have a DNR status/order. If DNR order is in place, obtain Addendum to the Surgical Consent for ALL Patients with a DNR Order to address nika-operative status for limited intervention or DNR suspension.      I have read and fully understand the above Consent for Operation/Procedure and that all blanks were completed before I signed the consent.   _____________________________       _____________________      ____/____am/pm  Signature of Patient or legal representative      Printed Name / Relationship            Date / Time   ____________________________       _____________________      ____/____am/pm  Witness to Signature                                    Printed Name                    Date / Time    If patient is unable to sign or is a minor, complete the following)  Patient is a minor, ____ years of age, or unable to sign because:   ______________________________________________________________________________________________    If a phone consent is obtained, consent will be documented by using two health care professionals, each affirming that the consenting party has no questions and gives consent for the procedure discussed with the physician/provider.   _____________________          ____________________       _____/_____am/pm   2nd witness to phone consent        Printed name           Date / Time    Informed Consent:  I have provided the explanation described above in section 1 to the patient and/or legal representative.  I have provided the patient and/or legal representative with an opportunity to ask any questions about the proposed operation/procedure.   ___________________________          ____________________         ____/____am/pm  Provider / Proceduralist                            Printed name            Date / Time  Revised 8/2/2021                                                                      Page 2 of 2

## 2023-01-23 ENCOUNTER — ANESTHESIA EVENT (OUTPATIENT)
Dept: OPERATING ROOM | Age: 73
End: 2023-01-23
Payer: MEDICARE

## 2023-01-24 ENCOUNTER — ANESTHESIA (OUTPATIENT)
Dept: OPERATING ROOM | Age: 73
End: 2023-01-24
Payer: MEDICARE

## 2023-01-24 ENCOUNTER — APPOINTMENT (OUTPATIENT)
Dept: GENERAL RADIOLOGY | Age: 73
End: 2023-01-24
Attending: ORTHOPAEDIC SURGERY
Payer: MEDICARE

## 2023-01-24 ENCOUNTER — HOSPITAL ENCOUNTER (OUTPATIENT)
Age: 73
Setting detail: OBSERVATION
Discharge: NURSING, REHAB OR LONG-TERM CARE | End: 2023-01-27
Attending: ORTHOPAEDIC SURGERY | Admitting: ORTHOPAEDIC SURGERY
Payer: MEDICARE

## 2023-01-24 DIAGNOSIS — M17.12 OSTEOARTHRITIS OF LEFT KNEE, UNSPECIFIED OSTEOARTHRITIS TYPE: Primary | ICD-10-CM

## 2023-01-24 LAB
ABO/RH: NORMAL
ANTIBODY SCREEN: NORMAL
APTT: 24.7 SEC (ref 23–34.3)
EKG ATRIAL RATE: 67 BPM
EKG DIAGNOSIS: NORMAL
EKG P AXIS: 29 DEGREES
EKG P-R INTERVAL: 152 MS
EKG Q-T INTERVAL: 444 MS
EKG QRS DURATION: 148 MS
EKG QTC CALCULATION (BAZETT): 469 MS
EKG R AXIS: -22 DEGREES
EKG T AXIS: 15 DEGREES
EKG VENTRICULAR RATE: 67 BPM
GLUCOSE BLD-MCNC: 97 MG/DL (ref 70–99)
PERFORMED ON: NORMAL

## 2023-01-24 PROCEDURE — 64447 NJX AA&/STRD FEMORAL NRV IMG: CPT | Performed by: ANESTHESIOLOGY

## 2023-01-24 PROCEDURE — 85730 THROMBOPLASTIN TIME PARTIAL: CPT

## 2023-01-24 PROCEDURE — 6360000002 HC RX W HCPCS: Performed by: PHYSICIAN ASSISTANT

## 2023-01-24 PROCEDURE — 6360000002 HC RX W HCPCS: Performed by: NURSE ANESTHETIST, CERTIFIED REGISTERED

## 2023-01-24 PROCEDURE — 3700000000 HC ANESTHESIA ATTENDED CARE: Performed by: ORTHOPAEDIC SURGERY

## 2023-01-24 PROCEDURE — 3600000014 HC SURGERY LEVEL 4 ADDTL 15MIN: Performed by: ORTHOPAEDIC SURGERY

## 2023-01-24 PROCEDURE — 86850 RBC ANTIBODY SCREEN: CPT

## 2023-01-24 PROCEDURE — A4217 STERILE WATER/SALINE, 500 ML: HCPCS | Performed by: ORTHOPAEDIC SURGERY

## 2023-01-24 PROCEDURE — 6360000002 HC RX W HCPCS: Performed by: ANESTHESIOLOGY

## 2023-01-24 PROCEDURE — 6360000002 HC RX W HCPCS: Performed by: ORTHOPAEDIC SURGERY

## 2023-01-24 PROCEDURE — 2580000003 HC RX 258: Performed by: ANESTHESIOLOGY

## 2023-01-24 PROCEDURE — C1713 ANCHOR/SCREW BN/BN,TIS/BN: HCPCS | Performed by: ORTHOPAEDIC SURGERY

## 2023-01-24 PROCEDURE — 62325 NJX INTERLAMINAR CRV/THRC: CPT | Performed by: ANESTHESIOLOGY

## 2023-01-24 PROCEDURE — 6370000000 HC RX 637 (ALT 250 FOR IP): Performed by: PHYSICIAN ASSISTANT

## 2023-01-24 PROCEDURE — 2580000003 HC RX 258: Performed by: ORTHOPAEDIC SURGERY

## 2023-01-24 PROCEDURE — 7100000000 HC PACU RECOVERY - FIRST 15 MIN: Performed by: ORTHOPAEDIC SURGERY

## 2023-01-24 PROCEDURE — 3700000001 HC ADD 15 MINUTES (ANESTHESIA): Performed by: ORTHOPAEDIC SURGERY

## 2023-01-24 PROCEDURE — 2580000003 HC RX 258: Performed by: NURSE ANESTHETIST, CERTIFIED REGISTERED

## 2023-01-24 PROCEDURE — 2580000003 HC RX 258: Performed by: PHYSICIAN ASSISTANT

## 2023-01-24 PROCEDURE — 2709999900 HC NON-CHARGEABLE SUPPLY: Performed by: ORTHOPAEDIC SURGERY

## 2023-01-24 PROCEDURE — 6370000000 HC RX 637 (ALT 250 FOR IP): Performed by: FAMILY MEDICINE

## 2023-01-24 PROCEDURE — 83036 HEMOGLOBIN GLYCOSYLATED A1C: CPT

## 2023-01-24 PROCEDURE — 6370000000 HC RX 637 (ALT 250 FOR IP): Performed by: ORTHOPAEDIC SURGERY

## 2023-01-24 PROCEDURE — 93005 ELECTROCARDIOGRAM TRACING: CPT | Performed by: ORTHOPAEDIC SURGERY

## 2023-01-24 PROCEDURE — 86900 BLOOD TYPING SEROLOGIC ABO: CPT

## 2023-01-24 PROCEDURE — C1776 JOINT DEVICE (IMPLANTABLE): HCPCS | Performed by: ORTHOPAEDIC SURGERY

## 2023-01-24 PROCEDURE — 93010 ELECTROCARDIOGRAM REPORT: CPT | Performed by: INTERNAL MEDICINE

## 2023-01-24 PROCEDURE — G0378 HOSPITAL OBSERVATION PER HR: HCPCS

## 2023-01-24 PROCEDURE — 3600000004 HC SURGERY LEVEL 4 BASE: Performed by: ORTHOPAEDIC SURGERY

## 2023-01-24 PROCEDURE — 2500000003 HC RX 250 WO HCPCS: Performed by: NURSE ANESTHETIST, CERTIFIED REGISTERED

## 2023-01-24 PROCEDURE — 2500000003 HC RX 250 WO HCPCS: Performed by: ORTHOPAEDIC SURGERY

## 2023-01-24 PROCEDURE — 7100000001 HC PACU RECOVERY - ADDTL 15 MIN: Performed by: ORTHOPAEDIC SURGERY

## 2023-01-24 PROCEDURE — 86901 BLOOD TYPING SEROLOGIC RH(D): CPT

## 2023-01-24 PROCEDURE — 73560 X-RAY EXAM OF KNEE 1 OR 2: CPT

## 2023-01-24 PROCEDURE — 2720000010 HC SURG SUPPLY STERILE: Performed by: ORTHOPAEDIC SURGERY

## 2023-01-24 DEVICE — NEXGEN CR FLEX PROLONG ART SURF C-H/56 GRN 10MM: Type: IMPLANTABLE DEVICE | Site: KNEE | Status: FUNCTIONAL

## 2023-01-24 DEVICE — SCREW BNE L48MM CONSTRN CNDYL KNEE HEX HD STEM FOR LEG NXGN: Type: IMPLANTABLE DEVICE | Site: KNEE | Status: FUNCTIONAL

## 2023-01-24 DEVICE — NEXGEN PRECOAT PEG TIB PLATE SZ 6: Type: IMPLANTABLE DEVICE | Site: KNEE | Status: FUNCTIONAL

## 2023-01-24 DEVICE — KNEE K1 TOT HEMI STD CEM IMPL CAPPED K1 ZIM: Type: IMPLANTABLE DEVICE | Site: KNEE | Status: FUNCTIONAL

## 2023-01-24 DEVICE — CEMENT BNE 20ML 41GM FULL DOSE PMMA W/ TOBRA M VISC RADPQ: Type: IMPLANTABLE DEVICE | Site: KNEE | Status: FUNCTIONAL

## 2023-01-24 DEVICE — IMPLANTABLE DEVICE: Type: IMPLANTABLE DEVICE | Site: KNEE | Status: FUNCTIONAL

## 2023-01-24 DEVICE — NEXGEN ALL-POLY PATELLA 35MM: Type: IMPLANTABLE DEVICE | Site: KNEE | Status: FUNCTIONAL

## 2023-01-24 RX ORDER — PANTOPRAZOLE SODIUM 40 MG/1
40 TABLET, DELAYED RELEASE ORAL
Status: DISCONTINUED | OUTPATIENT
Start: 2023-01-25 | End: 2023-01-27 | Stop reason: HOSPADM

## 2023-01-24 RX ORDER — LIDOCAINE HYDROCHLORIDE 20 MG/ML
INJECTION, SOLUTION EPIDURAL; INFILTRATION; INTRACAUDAL; PERINEURAL PRN
Status: DISCONTINUED | OUTPATIENT
Start: 2023-01-24 | End: 2023-01-24 | Stop reason: SDUPTHER

## 2023-01-24 RX ORDER — METHOCARBAMOL 500 MG/1
500 TABLET, FILM COATED ORAL 4 TIMES DAILY
Status: DISCONTINUED | OUTPATIENT
Start: 2023-01-24 | End: 2023-01-27 | Stop reason: HOSPADM

## 2023-01-24 RX ORDER — MAGNESIUM HYDROXIDE 1200 MG/15ML
LIQUID ORAL CONTINUOUS PRN
Status: DISCONTINUED | OUTPATIENT
Start: 2023-01-24 | End: 2023-01-24 | Stop reason: HOSPADM

## 2023-01-24 RX ORDER — ARFORMOTEROL TARTRATE 15 UG/2ML
15 SOLUTION RESPIRATORY (INHALATION) 2 TIMES DAILY
Status: DISCONTINUED | OUTPATIENT
Start: 2023-01-24 | End: 2023-01-27 | Stop reason: HOSPADM

## 2023-01-24 RX ORDER — GLYCOPYRROLATE 0.2 MG/ML
INJECTION INTRAMUSCULAR; INTRAVENOUS PRN
Status: DISCONTINUED | OUTPATIENT
Start: 2023-01-24 | End: 2023-01-24 | Stop reason: SDUPTHER

## 2023-01-24 RX ORDER — ROPIVACAINE HYDROCHLORIDE 5 MG/ML
INJECTION, SOLUTION EPIDURAL; INFILTRATION; PERINEURAL
Status: COMPLETED
Start: 2023-01-24 | End: 2023-01-24

## 2023-01-24 RX ORDER — LABETALOL HYDROCHLORIDE 5 MG/ML
10 INJECTION, SOLUTION INTRAVENOUS
Status: DISCONTINUED | OUTPATIENT
Start: 2023-01-24 | End: 2023-01-24 | Stop reason: HOSPADM

## 2023-01-24 RX ORDER — ONDANSETRON 2 MG/ML
INJECTION INTRAMUSCULAR; INTRAVENOUS PRN
Status: DISCONTINUED | OUTPATIENT
Start: 2023-01-24 | End: 2023-01-24 | Stop reason: SDUPTHER

## 2023-01-24 RX ORDER — FENTANYL CITRATE 50 UG/ML
INJECTION, SOLUTION INTRAMUSCULAR; INTRAVENOUS PRN
Status: DISCONTINUED | OUTPATIENT
Start: 2023-01-24 | End: 2023-01-24 | Stop reason: SDUPTHER

## 2023-01-24 RX ORDER — SODIUM CHLORIDE 9 MG/ML
INJECTION, SOLUTION INTRAVENOUS PRN
Status: DISCONTINUED | OUTPATIENT
Start: 2023-01-24 | End: 2023-01-27 | Stop reason: HOSPADM

## 2023-01-24 RX ORDER — ACETAMINOPHEN 325 MG/1
650 TABLET ORAL EVERY 6 HOURS
Status: DISCONTINUED | OUTPATIENT
Start: 2023-01-24 | End: 2023-01-27 | Stop reason: HOSPADM

## 2023-01-24 RX ORDER — GABAPENTIN 400 MG/1
400 CAPSULE ORAL 2 TIMES DAILY
Status: DISCONTINUED | OUTPATIENT
Start: 2023-01-24 | End: 2023-01-27 | Stop reason: HOSPADM

## 2023-01-24 RX ORDER — MIDAZOLAM HYDROCHLORIDE 1 MG/ML
INJECTION INTRAMUSCULAR; INTRAVENOUS PRN
Status: DISCONTINUED | OUTPATIENT
Start: 2023-01-24 | End: 2023-01-24 | Stop reason: SDUPTHER

## 2023-01-24 RX ORDER — FENTANYL CITRATE 50 UG/ML
INJECTION, SOLUTION INTRAMUSCULAR; INTRAVENOUS
Status: COMPLETED
Start: 2023-01-24 | End: 2023-01-24

## 2023-01-24 RX ORDER — SUCCINYLCHOLINE/SOD CL,ISO/PF 100 MG/5ML
SYRINGE (ML) INTRAVENOUS PRN
Status: DISCONTINUED | OUTPATIENT
Start: 2023-01-24 | End: 2023-01-24 | Stop reason: SDUPTHER

## 2023-01-24 RX ORDER — SODIUM CHLORIDE 9 MG/ML
INJECTION, SOLUTION INTRAVENOUS CONTINUOUS
Status: DISCONTINUED | OUTPATIENT
Start: 2023-01-24 | End: 2023-01-27 | Stop reason: HOSPADM

## 2023-01-24 RX ORDER — SULINDAC 150 MG/1
150 TABLET ORAL 2 TIMES DAILY
Status: DISCONTINUED | OUTPATIENT
Start: 2023-01-24 | End: 2023-01-27 | Stop reason: HOSPADM

## 2023-01-24 RX ORDER — ARFORMOTEROL TARTRATE 15 UG/2ML
15 SOLUTION RESPIRATORY (INHALATION) 2 TIMES DAILY
Status: DISCONTINUED | OUTPATIENT
Start: 2023-01-24 | End: 2023-01-24

## 2023-01-24 RX ORDER — OXYCODONE HYDROCHLORIDE 5 MG/1
5 TABLET ORAL ONCE
Status: COMPLETED | OUTPATIENT
Start: 2023-01-24 | End: 2023-01-24

## 2023-01-24 RX ORDER — SODIUM CHLORIDE, SODIUM LACTATE, POTASSIUM CHLORIDE, CALCIUM CHLORIDE 600; 310; 30; 20 MG/100ML; MG/100ML; MG/100ML; MG/100ML
INJECTION, SOLUTION INTRAVENOUS CONTINUOUS
Status: DISCONTINUED | OUTPATIENT
Start: 2023-01-24 | End: 2023-01-24 | Stop reason: HOSPADM

## 2023-01-24 RX ORDER — ACETAMINOPHEN 500 MG
1000 TABLET ORAL ONCE
Status: COMPLETED | OUTPATIENT
Start: 2023-01-24 | End: 2023-01-24

## 2023-01-24 RX ORDER — FENTANYL CITRATE 50 UG/ML
25 INJECTION, SOLUTION INTRAMUSCULAR; INTRAVENOUS EVERY 5 MIN PRN
Status: DISCONTINUED | OUTPATIENT
Start: 2023-01-24 | End: 2023-01-24 | Stop reason: HOSPADM

## 2023-01-24 RX ORDER — ATORVASTATIN CALCIUM 20 MG/1
20 TABLET, FILM COATED ORAL NIGHTLY
Status: DISCONTINUED | OUTPATIENT
Start: 2023-01-24 | End: 2023-01-27 | Stop reason: HOSPADM

## 2023-01-24 RX ORDER — ONDANSETRON 2 MG/ML
4 INJECTION INTRAMUSCULAR; INTRAVENOUS
Status: DISCONTINUED | OUTPATIENT
Start: 2023-01-24 | End: 2023-01-24 | Stop reason: HOSPADM

## 2023-01-24 RX ORDER — SODIUM CHLORIDE 0.9 % (FLUSH) 0.9 %
5-40 SYRINGE (ML) INJECTION EVERY 12 HOURS SCHEDULED
Status: DISCONTINUED | OUTPATIENT
Start: 2023-01-24 | End: 2023-01-27 | Stop reason: HOSPADM

## 2023-01-24 RX ORDER — MORPHINE SULFATE 2 MG/ML
4 INJECTION, SOLUTION INTRAMUSCULAR; INTRAVENOUS
Status: DISPENSED | OUTPATIENT
Start: 2023-01-24 | End: 2023-01-26

## 2023-01-24 RX ORDER — AMLODIPINE BESYLATE 5 MG/1
5 TABLET ORAL DAILY
Status: DISCONTINUED | OUTPATIENT
Start: 2023-01-24 | End: 2023-01-24

## 2023-01-24 RX ORDER — PROPOFOL 10 MG/ML
INJECTION, EMULSION INTRAVENOUS PRN
Status: DISCONTINUED | OUTPATIENT
Start: 2023-01-24 | End: 2023-01-24 | Stop reason: SDUPTHER

## 2023-01-24 RX ORDER — LANOLIN ALCOHOL/MO/W.PET/CERES
100 CREAM (GRAM) TOPICAL DAILY
Status: DISCONTINUED | OUTPATIENT
Start: 2023-01-24 | End: 2023-01-27 | Stop reason: HOSPADM

## 2023-01-24 RX ORDER — HYDRALAZINE HYDROCHLORIDE 20 MG/ML
10 INJECTION INTRAMUSCULAR; INTRAVENOUS
Status: DISCONTINUED | OUTPATIENT
Start: 2023-01-24 | End: 2023-01-24 | Stop reason: HOSPADM

## 2023-01-24 RX ORDER — MELOXICAM 7.5 MG/1
3.75 TABLET ORAL DAILY
Status: DISCONTINUED | OUTPATIENT
Start: 2023-01-24 | End: 2023-01-24

## 2023-01-24 RX ORDER — IPRATROPIUM BROMIDE 21 UG/1
2 SPRAY, METERED NASAL 3 TIMES DAILY
Status: DISCONTINUED | OUTPATIENT
Start: 2023-01-24 | End: 2023-01-27 | Stop reason: HOSPADM

## 2023-01-24 RX ORDER — BUDESONIDE 0.5 MG/2ML
0.5 INHALANT ORAL 2 TIMES DAILY
Status: DISCONTINUED | OUTPATIENT
Start: 2023-01-25 | End: 2023-01-27 | Stop reason: HOSPADM

## 2023-01-24 RX ORDER — ROPIVACAINE HYDROCHLORIDE 5 MG/ML
INJECTION, SOLUTION EPIDURAL; INFILTRATION; PERINEURAL PRN
Status: DISCONTINUED | OUTPATIENT
Start: 2023-01-24 | End: 2023-01-24 | Stop reason: SDUPTHER

## 2023-01-24 RX ORDER — POTASSIUM CHLORIDE 750 MG/1
10 TABLET, EXTENDED RELEASE ORAL DAILY
Status: DISCONTINUED | OUTPATIENT
Start: 2023-01-24 | End: 2023-01-27 | Stop reason: HOSPADM

## 2023-01-24 RX ORDER — ROCURONIUM BROMIDE 10 MG/ML
INJECTION, SOLUTION INTRAVENOUS PRN
Status: DISCONTINUED | OUTPATIENT
Start: 2023-01-24 | End: 2023-01-24 | Stop reason: SDUPTHER

## 2023-01-24 RX ORDER — LATANOPROST 50 UG/ML
1 SOLUTION/ DROPS OPHTHALMIC NIGHTLY
Status: DISCONTINUED | OUTPATIENT
Start: 2023-01-24 | End: 2023-01-27 | Stop reason: HOSPADM

## 2023-01-24 RX ORDER — ASPIRIN 81 MG/1
81 TABLET ORAL 2 TIMES DAILY
Status: DISCONTINUED | OUTPATIENT
Start: 2023-01-24 | End: 2023-01-27 | Stop reason: HOSPADM

## 2023-01-24 RX ORDER — BUPIVACAINE HYDROCHLORIDE 2.5 MG/ML
INJECTION, SOLUTION EPIDURAL; INFILTRATION; INTRACAUDAL PRN
Status: DISCONTINUED | OUTPATIENT
Start: 2023-01-24 | End: 2023-01-24 | Stop reason: HOSPADM

## 2023-01-24 RX ORDER — DEXAMETHASONE SODIUM PHOSPHATE 10 MG/ML
10 INJECTION, SOLUTION INTRAMUSCULAR; INTRAVENOUS ONCE
Status: COMPLETED | OUTPATIENT
Start: 2023-01-24 | End: 2023-01-24

## 2023-01-24 RX ORDER — OXYCODONE HYDROCHLORIDE 5 MG/1
5 TABLET ORAL EVERY 4 HOURS PRN
Status: DISCONTINUED | OUTPATIENT
Start: 2023-01-24 | End: 2023-01-27 | Stop reason: HOSPADM

## 2023-01-24 RX ORDER — BUDESONIDE 0.5 MG/2ML
0.5 INHALANT ORAL 2 TIMES DAILY
Status: DISCONTINUED | OUTPATIENT
Start: 2023-01-24 | End: 2023-01-24

## 2023-01-24 RX ORDER — FUROSEMIDE 20 MG/1
20 TABLET ORAL DAILY
Status: DISCONTINUED | OUTPATIENT
Start: 2023-01-24 | End: 2023-01-27 | Stop reason: HOSPADM

## 2023-01-24 RX ORDER — SODIUM CHLORIDE 0.9 % (FLUSH) 0.9 %
5-40 SYRINGE (ML) INJECTION EVERY 12 HOURS SCHEDULED
Status: DISCONTINUED | OUTPATIENT
Start: 2023-01-24 | End: 2023-01-24 | Stop reason: HOSPADM

## 2023-01-24 RX ORDER — MIDAZOLAM HYDROCHLORIDE 1 MG/ML
INJECTION INTRAMUSCULAR; INTRAVENOUS
Status: COMPLETED
Start: 2023-01-24 | End: 2023-01-24

## 2023-01-24 RX ORDER — PROCHLORPERAZINE EDISYLATE 5 MG/ML
5 INJECTION INTRAMUSCULAR; INTRAVENOUS
Status: DISCONTINUED | OUTPATIENT
Start: 2023-01-24 | End: 2023-01-24 | Stop reason: HOSPADM

## 2023-01-24 RX ORDER — SODIUM CHLORIDE 0.9 % (FLUSH) 0.9 %
5-40 SYRINGE (ML) INJECTION PRN
Status: DISCONTINUED | OUTPATIENT
Start: 2023-01-24 | End: 2023-01-27 | Stop reason: HOSPADM

## 2023-01-24 RX ORDER — OXYCODONE HCL 10 MG/1
10 TABLET, FILM COATED, EXTENDED RELEASE ORAL ONCE
Status: COMPLETED | OUTPATIENT
Start: 2023-01-24 | End: 2023-01-24

## 2023-01-24 RX ORDER — BRIMONIDINE TARTRATE 2 MG/ML
1 SOLUTION/ DROPS OPHTHALMIC DAILY
Status: DISCONTINUED | OUTPATIENT
Start: 2023-01-25 | End: 2023-01-27 | Stop reason: HOSPADM

## 2023-01-24 RX ORDER — DEXAMETHASONE SODIUM PHOSPHATE 4 MG/ML
INJECTION, SOLUTION INTRA-ARTICULAR; INTRALESIONAL; INTRAMUSCULAR; INTRAVENOUS; SOFT TISSUE PRN
Status: DISCONTINUED | OUTPATIENT
Start: 2023-01-24 | End: 2023-01-24 | Stop reason: SDUPTHER

## 2023-01-24 RX ORDER — MORPHINE SULFATE 2 MG/ML
2 INJECTION, SOLUTION INTRAMUSCULAR; INTRAVENOUS
Status: ACTIVE | OUTPATIENT
Start: 2023-01-24 | End: 2023-01-26

## 2023-01-24 RX ORDER — SODIUM CHLORIDE 0.9 % (FLUSH) 0.9 %
5-40 SYRINGE (ML) INJECTION PRN
Status: DISCONTINUED | OUTPATIENT
Start: 2023-01-24 | End: 2023-01-24 | Stop reason: HOSPADM

## 2023-01-24 RX ORDER — AMLODIPINE BESYLATE 5 MG/1
5 TABLET ORAL DAILY
Status: DISCONTINUED | OUTPATIENT
Start: 2023-01-25 | End: 2023-01-27 | Stop reason: HOSPADM

## 2023-01-24 RX ORDER — SODIUM CHLORIDE 9 MG/ML
INJECTION, SOLUTION INTRAVENOUS PRN
Status: DISCONTINUED | OUTPATIENT
Start: 2023-01-24 | End: 2023-01-24 | Stop reason: HOSPADM

## 2023-01-24 RX ADMIN — SUGAMMADEX 100 MG: 100 INJECTION, SOLUTION INTRAVENOUS at 11:00

## 2023-01-24 RX ADMIN — ROPIVACAINE HYDROCHLORIDE 30 ML: 5 INJECTION, SOLUTION EPIDURAL; INFILTRATION; PERINEURAL at 09:00

## 2023-01-24 RX ADMIN — SUGAMMADEX 100 MG: 100 INJECTION, SOLUTION INTRAVENOUS at 11:06

## 2023-01-24 RX ADMIN — MORPHINE SULFATE 4 MG: 2 INJECTION, SOLUTION INTRAMUSCULAR; INTRAVENOUS at 23:56

## 2023-01-24 RX ADMIN — OXYCODONE HYDROCHLORIDE 5 MG: 5 TABLET ORAL at 21:20

## 2023-01-24 RX ADMIN — SUGAMMADEX 100 MG: 100 INJECTION, SOLUTION INTRAVENOUS at 11:09

## 2023-01-24 RX ADMIN — ACETAMINOPHEN 650 MG: 325 TABLET ORAL at 21:20

## 2023-01-24 RX ADMIN — DEXAMETHASONE SODIUM PHOSPHATE 4 MG: 4 INJECTION, SOLUTION INTRAMUSCULAR; INTRAVENOUS at 09:48

## 2023-01-24 RX ADMIN — Medication 100 MG: at 21:19

## 2023-01-24 RX ADMIN — ONDANSETRON 4 MG: 2 INJECTION INTRAMUSCULAR; INTRAVENOUS at 10:48

## 2023-01-24 RX ADMIN — VANCOMYCIN HYDROCHLORIDE 1750 MG: 10 INJECTION, POWDER, LYOPHILIZED, FOR SOLUTION INTRAVENOUS at 07:58

## 2023-01-24 RX ADMIN — DEXMEDETOMIDINE HYDROCHLORIDE 6 MCG: 100 INJECTION, SOLUTION INTRAVENOUS at 10:41

## 2023-01-24 RX ADMIN — SULINDAC 150 MG: 150 TABLET ORAL at 21:22

## 2023-01-24 RX ADMIN — LIDOCAINE HYDROCHLORIDE 100 MG: 20 INJECTION, SOLUTION EPIDURAL; INFILTRATION; INTRACAUDAL; PERINEURAL at 09:34

## 2023-01-24 RX ADMIN — ROCURONIUM BROMIDE 5 MG: 10 INJECTION INTRAVENOUS at 09:34

## 2023-01-24 RX ADMIN — VANCOMYCIN HYDROCHLORIDE 1750 MG: 10 INJECTION, POWDER, LYOPHILIZED, FOR SOLUTION INTRAVENOUS at 09:28

## 2023-01-24 RX ADMIN — ACETAMINOPHEN 1000 MG: 500 TABLET ORAL at 07:55

## 2023-01-24 RX ADMIN — POTASSIUM CHLORIDE 10 MEQ: 750 TABLET, EXTENDED RELEASE ORAL at 21:20

## 2023-01-24 RX ADMIN — LATANOPROST 1 DROP: 50 SOLUTION/ DROPS OPHTHALMIC at 21:21

## 2023-01-24 RX ADMIN — GLYCOPYRROLATE 0.2 MG: 0.2 INJECTION INTRAMUSCULAR; INTRAVENOUS at 09:44

## 2023-01-24 RX ADMIN — CEFAZOLIN 2000 MG: 2 INJECTION, POWDER, FOR SOLUTION INTRAMUSCULAR; INTRAVENOUS at 21:21

## 2023-01-24 RX ADMIN — SODIUM CHLORIDE: 9 INJECTION, SOLUTION INTRAVENOUS at 21:19

## 2023-01-24 RX ADMIN — OXYCODONE 5 MG: 5 TABLET ORAL at 18:31

## 2023-01-24 RX ADMIN — DEXAMETHASONE SODIUM PHOSPHATE 10 MG: 10 INJECTION, SOLUTION INTRAMUSCULAR; INTRAVENOUS at 07:55

## 2023-01-24 RX ADMIN — ATORVASTATIN CALCIUM 20 MG: 20 TABLET, FILM COATED ORAL at 21:19

## 2023-01-24 RX ADMIN — Medication 140 MG: at 09:34

## 2023-01-24 RX ADMIN — SODIUM CHLORIDE, POTASSIUM CHLORIDE, SODIUM LACTATE AND CALCIUM CHLORIDE: 600; 310; 30; 20 INJECTION, SOLUTION INTRAVENOUS at 07:56

## 2023-01-24 RX ADMIN — GABAPENTIN 400 MG: 400 CAPSULE ORAL at 21:19

## 2023-01-24 RX ADMIN — SODIUM CHLORIDE, POTASSIUM CHLORIDE, SODIUM LACTATE AND CALCIUM CHLORIDE: 600; 310; 30; 20 INJECTION, SOLUTION INTRAVENOUS at 11:09

## 2023-01-24 RX ADMIN — SUGAMMADEX 100 MG: 100 INJECTION, SOLUTION INTRAVENOUS at 11:03

## 2023-01-24 RX ADMIN — ASPIRIN 81 MG: 81 TABLET, COATED ORAL at 21:20

## 2023-01-24 RX ADMIN — SODIUM CHLORIDE, PRESERVATIVE FREE 10 ML: 5 INJECTION INTRAVENOUS at 21:20

## 2023-01-24 RX ADMIN — PHENYLEPHRINE HYDROCHLORIDE 200 MCG: 10 INJECTION, SOLUTION INTRAMUSCULAR; INTRAVENOUS; SUBCUTANEOUS at 09:42

## 2023-01-24 RX ADMIN — FENTANYL CITRATE 100 MCG: 50 INJECTION, SOLUTION INTRAMUSCULAR; INTRAVENOUS at 09:00

## 2023-01-24 RX ADMIN — PROPOFOL 150 MG: 10 INJECTION, EMULSION INTRAVENOUS at 09:34

## 2023-01-24 RX ADMIN — METHOCARBAMOL 500 MG: 500 TABLET ORAL at 21:20

## 2023-01-24 RX ADMIN — ROCURONIUM BROMIDE 45 MG: 10 INJECTION INTRAVENOUS at 09:47

## 2023-01-24 RX ADMIN — TRANEXAMIC ACID 1000 MG: 100 INJECTION INTRAVENOUS at 09:42

## 2023-01-24 RX ADMIN — CEFAZOLIN 2000 MG: 2 INJECTION, POWDER, FOR SOLUTION INTRAMUSCULAR; INTRAVENOUS at 09:36

## 2023-01-24 RX ADMIN — HYDROMORPHONE HYDROCHLORIDE 0.5 MG: 1 INJECTION, SOLUTION INTRAMUSCULAR; INTRAVENOUS; SUBCUTANEOUS at 13:29

## 2023-01-24 RX ADMIN — MIDAZOLAM HYDROCHLORIDE 2 MG: 2 INJECTION, SOLUTION INTRAMUSCULAR; INTRAVENOUS at 09:00

## 2023-01-24 RX ADMIN — OXYCODONE HYDROCHLORIDE 10 MG: 10 TABLET, FILM COATED, EXTENDED RELEASE ORAL at 07:55

## 2023-01-24 ASSESSMENT — PAIN DESCRIPTION - ORIENTATION
ORIENTATION: LEFT

## 2023-01-24 ASSESSMENT — PAIN DESCRIPTION - DESCRIPTORS
DESCRIPTORS: ACHING;DISCOMFORT
DESCRIPTORS: ACHING
DESCRIPTORS: ACHING;DISCOMFORT
DESCRIPTORS: ACHING

## 2023-01-24 ASSESSMENT — PAIN DESCRIPTION - LOCATION
LOCATION: KNEE
LOCATION: KNEE;NECK
LOCATION: KNEE

## 2023-01-24 ASSESSMENT — PAIN SCALES - GENERAL
PAINLEVEL_OUTOF10: 7
PAINLEVEL_OUTOF10: 6
PAINLEVEL_OUTOF10: 0
PAINLEVEL_OUTOF10: 6
PAINLEVEL_OUTOF10: 0
PAINLEVEL_OUTOF10: 6
PAINLEVEL_OUTOF10: 8
PAINLEVEL_OUTOF10: 0
PAINLEVEL_OUTOF10: 6
PAINLEVEL_OUTOF10: 8
PAINLEVEL_OUTOF10: 8
PAINLEVEL_OUTOF10: 6
PAINLEVEL_OUTOF10: 6
PAINLEVEL_OUTOF10: 0

## 2023-01-24 ASSESSMENT — PAIN - FUNCTIONAL ASSESSMENT
PAIN_FUNCTIONAL_ASSESSMENT: PREVENTS OR INTERFERES SOME ACTIVE ACTIVITIES AND ADLS
PAIN_FUNCTIONAL_ASSESSMENT: PREVENTS OR INTERFERES SOME ACTIVE ACTIVITIES AND ADLS
PAIN_FUNCTIONAL_ASSESSMENT: 0-10

## 2023-01-24 ASSESSMENT — PAIN DESCRIPTION - PAIN TYPE
TYPE: SURGICAL PAIN
TYPE: ACUTE PAIN;SURGICAL PAIN
TYPE: SURGICAL PAIN
TYPE: ACUTE PAIN;SURGICAL PAIN
TYPE: SURGICAL PAIN
TYPE: SURGICAL PAIN

## 2023-01-24 ASSESSMENT — PAIN DESCRIPTION - ONSET
ONSET: GRADUAL
ONSET: ON-GOING

## 2023-01-24 ASSESSMENT — PAIN DESCRIPTION - FREQUENCY
FREQUENCY: CONTINUOUS
FREQUENCY: CONTINUOUS

## 2023-01-24 ASSESSMENT — LIFESTYLE VARIABLES: SMOKING_STATUS: 0

## 2023-01-24 NOTE — OP NOTE
PREOPERATIVE DIAGNOSIS: Left knee tricompartmental arthrosis. POSTOPERATIVE DIAGNOSIS: Left knee tricompartmental arthrosis. PROCEDURE(S) PERFORMED: Minimally Invasive left total knee arthroplasty, cemented,  cruciate-retaining. SURGEON: Fer Blanco M.D. FIRST SURGICAL ASSISTANT:  SEDRICK Kamara. The Physician Assistant was necessary to perform the surgery today by assisting with application of implants and manipulation of the extremity. This help was not otherwise available in the operating room today. PRE-OPERATIVE ANTIBIOTICS:  Cefazolin 2G + 1G Vancomycin    ANESTHESIA:  General + Adductor Canal Block + 40 cc of 0.25% Bupivacaine Periarticular injection    ESTIMATED BLOOD LOSS: 50 cc     INTRAVENOUS FLUIDS: 1000 cc    URINE OUTPUT: 0 cc    TOURNIQUET TIME: 52 minutes at 399 mm Hg    COMPLICATIONS: None. SPECIMENS: None. IMPLANTS:   Irma NexGen CR Flex, size Left E femur  Irma NexGen 4 peg cemented tibia, size 6  All-polyethylene patella, size 35   Cruciate-retaining highly-crosslinked polyethylene insert, size 10 mm     OPERATIVE INDICATIONS: This patient had longstanding knee pain. They have attempted to treat this nonoperatively for years. At  this point they have exhausted all nonoperative options, and have persistent pain  and dysfunction. I offered them a total knee arthroplasty for treatment  of their symptomatic arthrosis. The risks and benefits of total knee  arthroplasty were clearly explained to the patient, and they elected to proceed  despite these risks. DETAILS OF PROCEDURE: The patient was greeted in the preoperative holding  area, and their operative extremity was identified and marked with an  indelible marker. The patient was transported to the operating theater where  anesthesia was obtained. The patient was placed supine on the operating table  table. A bump was applied under the ipsilateral hip. A tourniquet was applied  to the thigh.  The operative  extremity was prepped and draped in a standard sterile surgical fashion. The patient received intravenous antibiotics prior to the inflation of the tourniquet and  surgical incision. A final timeout was performed, verifying  correct patient, operative site and operative plan. The leg was exanguinated with an Ace bandage and the tourniquet was inflated. A minimally invasive medial-based incision was placed on the anterior medial knee. The extensor retinaculum was exposed, and a low mid-vastus approach was utilized to access the intraarticular space. Eburnated and arthritic bone was encountered. A small medial release was performed followed by resection of osteophytes from the medial plateau. The intra-patellar fat bad was resected in extension. The anterior cruciate ligament was resected and a drill was utilized to access the femoral canal.  An intramedullary minimally invasive distal femoral cutting guide was applied, and a distal femoral cut was made in 4 degrees of valgus. The knee was taken into extension where the patella was recessed followed by application of a patella protector. The minimally invasive extramedullary tibia alignment guide was pinned perpendicular to the long axis of the tibia with approximately 7 degrees of posterior slope in extension. Care was taken to preserve the  bone island for the posterior cruciate ligament. The tibia resection was removed in medial and lateral segments followed by excision of the medial and lateral menisci. The bipolar sealer was utilized to cauterize the posterior capsule and geniculate arteries for improved hemostasis. The femoral sizing guide was pinned in 3 degrees of external rotation. After sizing the femur in extension, drill holes were placed and the sizing guide was removed. The 4-in-1 cutting guide was pinned in placed followed by anterior, posterior and chamfer resections with an oscillating saw.    The 4-in-1 guide was removed. Laminar  was placed with the femur in flexion and posterior osteophytes were removed from the femur with a curved osteotome and kocher clamp. The tibia was then sized and the base plate trial was pinned into place with external rotation centered over the medial 1/3 of the tibia tubercle. The trial femur was placed followed by a trial liner insert. The knee was taken through a full range of motion. The knee was stable to varus and valgus stresses throughout the arc of motion with well balanced flexion and extension gaps. The posterior cruciate ligament was well balanced. The patella was then sized and drill holes placed. Osteophytes were removed from the lateral facet and trial patella button was placed. The patella tracked centrally. The tibia was prepared and a 2.0 drill utilized in the sclerotic subchondral bone. The cut surfaces were then copiously irrigated and thoroughly dried while simplex cement was mixed on the back table. The components were cemented into place and all excess cement removed. A trial liner was placed and the cement was allowed to cure in extension. After allowing the cement to cure I was once again took the knee through a full arc of motion verifying stability throughout the arc with balanced gaps and a well balanced posterior cruciate ligament. The actual liner was opened engaged into the tibia base plate. The wound was soaked in a dilute betadine solution followed by pulsatile irrigation with 1 L of sterile saline with bacitracin. I then turned my attention to closure of the wound. The capsule and extensor mechanism was closed with a running #2 quill suture followed by a running 0 quill in the subcutaneous tissue. A running 2-0 quill was placed in the subdermal layer followed by surgical adhesive on the incision. A sterile silver impregnated occlusive was placed followed by cast padding and an Ace bandage.   The tourniquet was deflated and the patient was transported to their hospital bed. The patient was transported to the post-operative anesthesia care unit in stable condition. All sponge and needle counts were correct x 2.

## 2023-01-24 NOTE — FLOWSHEET NOTE
Pt to room SDS 3 via bed. Pt alert and oriented. LR infusing at 125ml/hr. O2 at 2L/NC. Sat O2 91%. Increased O2 @ 3L/NC. Sat O2 increased to 94%. ACE bandage to left leg C/D/I. Ice pack in place. Neuro checks WNL. SCD and compression stocking in place to right leg. Pt c/o pain of 6 in left knee but refusing pain med at this time. Boxed lunch given. Pt tolerating po well. Verified with [facility] nurse manager, Poly Perez to operate in Phase 1 order set. 1859 Usman Mercado RN of 5T. Alissa Young will call this RN when bed becomes available. [phone] - Called report to RN on 5T.      1825 - Notified Dr. Rae Garces of increase in pain. Roxicodone ordered. 1831 - 5mg Roxicodone given. 1843 - Pt transferred to PACU via bed.

## 2023-01-24 NOTE — ANESTHESIA PRE PROCEDURE
Department of Anesthesiology  Preprocedure Note       Name:  Jony Cee   Age:  74 y.o.  :  1950                                          MRN:  [id number]         Date:  2023      Surgeon: Jane Smiley):  Kyle Ovalles MD    Procedure: Procedure(s): MINIMALLY INVASIVE LEFT TOTAL KNEE ARTHROPLASTY    Medications prior to admission:   Prior to Admission medications    Medication Sig Start Date End Date Taking? Authorizing Provider   esomeprazole (NEXIUM) 20 MG delayed release capsule Take 20 mg by mouth every morning (before breakfast) 22  Yes Historical Provider, MD   ipratropium (ATROVENT) 0.03 % nasal spray 2 sprays by Nasal route 3 times daily 22  Yes Historical Provider, MD   cefadroxil (DURICEF) 500 MG capsule  23   Historical Provider, MD   fluticasone [facility]) 50 MCG/ACT nasal spray  22   Historical Provider, MD   gabapentin (NEURONTIN) 400 MG capsule 2 times daily.  1 AM 2 HS 22   Historical Provider, MD   sulindac (CLINORIL) 150 MG tablet  23   Historical Provider, MD   oxyCODONE-acetaminophen (PERCOCET)  MG per tablet  22   Historical Provider, MD   latanoprost (XALATAN) 0.005 % ophthalmic solution Place 1 drop into both eyes nightly    Historical Provider, MD   aspirin 81 MG EC tablet Take 81 mg by mouth daily    Historical Provider, MD   Magnesium Citrate 100 MG TABS Take by mouth daily     Historical Provider, MD   Omega-3 Fatty Acids (FISH OIL) 1000 MG CAPS Take by mouth daily    Historical Provider, MD   albuterol sulfate  (90 Base) MCG/ACT inhaler Inhale 2 puffs into the lungs every 6 hours as needed 3/1/21   Historical Provider, MD   atorvastatin (LIPITOR) 20 MG tablet TAKE ONE TABLET BY MOUTH DAILY 21   Historical Provider, MD   brimonidine (ALPHAGAN) 0.2 % ophthalmic solution Place 1 drop into both eyes daily  21   Historical Provider, MD   budesonide-formoterol (SYMBICORT) 160-4.5 MCG/ACT AERO INHALE TWO PUFFS BY MOUTH TWICE A DAY 10/20/20   Historical Provider, MD   omeprazole (PRILOSEC) 10 MG capsule Take 10 mg by mouth daily. Historical Provider, MD   cetirizine (ZYRTEC) 10 MG tablet Take 10 mg by mouth daily. Historical Provider, MD   zolpidem (AMBIEN) 10 MG tablet Take 10 mg by mouth nightly as needed. Patient not taking: Reported on 1/24/2023    Historical Provider, MD   furosemide (LASIX) 20 MG tablet Take 20 mg by mouth daily. Historical Provider, MD   potassium chloride (KLOR-CON) 10 MEQ CR tablet Take 10 mEq by mouth daily. Historical Provider, MD   amlodipine (NORVASC) 5 MG tablet Take 5 mg by mouth daily 1/2 TABLET    Historical Provider, MD   oxycodone-acetaminophen (PERCOCET) 5-325 MG per tablet Take 2 tablets by mouth every 6 hours as needed. Patient not taking: Reported on 1/20/2023    Historical Provider, MD       Current medications:    Current Facility-Administered Medications   Medication Dose Route Frequency Provider Last Rate Last Admin    lactated ringers IV soln infusion   IntraVENous Continuous Lucien Sanchez MD        ceFAZolin (ANCEF) 2,000 mg in sodium chloride 0.9 % 50 mL IVPB (mini-bag)  2,000 mg IntraVENous Once Lucien Sanchez MD        vancomycin (VANCOCIN) 1,750 mg in dextrose 5 % 500 mL IVPB  15 mg/kg IntraVENous Once Lucien Sanchez  mL/hr at 01/24/23 0758 1,750 mg at 01/24/23 0758    tranexamic acid (CYKLOKAPRON) 1,000 mg in sodium chloride 0.9 % 60 mL IVPB  1,000 mg IntraVENous Once Lucien Sanchez MD        lactated ringers IV soln infusion   IntraVENous Continuous Faheem Jaffe  mL/hr at 01/24/23 0838 NoRateChange at 01/24/23 0838    midazolam (VERSED) 2 MG/2ML injection             fentaNYL (SUBLIMAZE) 100 MCG/2ML injection             ropivacaine (NAROPIN) 0.5% injection                Allergies:     Allergies   Allergen Reactions    Dairycare [Lactase-Lactobacillus]     Ciprofloxacin Rash    Sudafed [Pseudoephedrine Hcl] Rash  Sulfa Antibiotics Rash       Problem List:  There is no problem list on file for this patient. 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    Sleep apnea     uses CPAP       Past Surgical History:        Procedure Laterality Date    CHOLECYSTECTOMY      GASTRIC BYPASS SURGERY      SPLENECTOMY      UPPER GASTROINTESTINAL ENDOSCOPY N/A 02/23/2022    ESOPHAGOGASTRODUODENOSCOPY -SLEEP APNEA performed by Carlos Issa MD at [address] [place] [address] History:    Social History     Tobacco Use    Smoking status: Former     Types: Pipe     Quit date: 2013     Years since quitting: 10.0    Smokeless tobacco: Never   Substance Use Topics    Alcohol use: Not Currently                                Counseling given: Not Answered      Vital Signs (Current):   Vitals:    01/24/23 0845 01/24/23 0850 01/24/23 0855 01/24/23 0900   BP: 126/67 124/61 121/64 123/68   Pulse: 67 68 66 71   Resp: 24 18 22 26   Temp:       TempSrc:       SpO2: 99% 97% 98% 99%   Weight:       Height:                                                  BP Readings from Last 3 Encounters:   01/24/23 123/68   02/23/22 (!) 105/41   02/23/22 (!) 91/45       NPO Status: Time of last liquid consumption: 0500 (sips with med )                        Time of last solid consumption: 1800                        Date of last liquid consumption: 01/24/23                        Date of last solid food consumption: 01/23/23    BMI:   Wt Readings from Last 3 Encounters:   01/24/23 246 lb 3.2 oz (111.7 kg)   02/18/22 270 lb (122.5 kg)   07/08/11 274 lb (124.3 kg)     Body mass index is 38.56 kg/m².     CBC: No results found for: WBC, RBC, HGB, HCT, MCV, RDW, PLT    CMP: No results found for: NA, K, CL, CO2, BUN, CREATININE, GFRAA, AGRATIO, LABGLOM, GLUCOSE, GLU, PROT, CALCIUM, BILITOT, ALKPHOS, AST, ALT    POC Tests:   Recent Labs     01/24/23  0756   POCGLU 97       Coags:   Lab Results   Component Value Date/Time    APTT 24.7 01/24/2023 07:40 AM       HCG (If Applicable): No results found for: PREGTESTUR, PREGSERUM, HCG, HCGQUANT     ABGs: No results found for: PHART, PO2ART, EVF5GSN, MVO7IVU, BEART, V3GRIVIW     Type & Screen (If Applicable):  No results found for: LABABO, LABRH    Drug/Infectious Status (If Applicable):  No results found for: HIV, HEPCAB    COVID-19 Screening (If Applicable): No results found for: COVID19        Anesthesia Evaluation  Patient summary reviewed and Nursing notes reviewed no history of anesthetic complications:   Airway: Mallampati: II  TM distance: >3 FB   Neck ROM: limited  Mouth opening: > = 3 FB   Dental:    (+) upper dentures      Pulmonary: breath sounds clear to auscultation  (+) COPD:  sleep apnea: on CPAP,      (-) not a current smoker                           Cardiovascular:    (+) hypertension:,         Rhythm: regular  Rate: normal                 ROS comment: Denies CP or SOB     Neuro/Psych:               GI/Hepatic/Renal:   (+) GERD:, PUD, morbid obesity          Endo/Other:    (+) : arthritis: OA., .                 Abdominal:   (+) obese,           Vascular: Other Findings:           Anesthesia Plan      general and regional     ASA 3     (Difficult epidural placement, will proceed with GA  Adductor canal nerve block )  Induction: intravenous. MIPS: Prophylactic antiemetics administered. Anesthetic plan and risks discussed with patient. Plan discussed with CRNA.                     Mary Carmen Santana DO   1/24/2023

## 2023-01-24 NOTE — PROGRESS NOTES
Patient A/O. VSS    IV started and LR infusing    Vanc infusing per pump    BS- 97    Labs sent    Ancef to OR

## 2023-01-24 NOTE — ANESTHESIA PROCEDURE NOTES
Epidural Block    Patient location during procedure: pre-op  Start time: 1/24/2023 8:30 AM  Reason for block: post-op pain management and at surgeon's request  Staffing  Performed: anesthesiologist   Epidural  Patient position: sitting  Prep: ChloraPrep  Location: L3-4  Injection technique: SHADIA air and SHADIA saline  Needle  Needle type: Tuohy   Needle gauge: 16 G  Catheter type: end hole  Assessment  Outcomes: uncomplicated and patient tolerated procedure well  Additional Notes  Sterile prep. Sitting position. Timeout with SDS RN. 5 mL 1% lidocaine to skin at L3/L4. Os encountered several times despite multiple redirects. Repeat skin wheel 3 mL at L2/L3. Again Os encountered with several redirects. Decision made to proceed with GA.    Preanesthetic Checklist  Completed: patient identified, IV checked, site marked, risks and benefits discussed, surgical/procedural consents, equipment checked, pre-op evaluation, timeout performed, anesthesia consent given, oxygen available, monitors applied/VS acknowledged, fire risk safety assessment completed and verbalized and blood product R/B/A discussed and consented

## 2023-01-24 NOTE — PROGRESS NOTES
PACU Transfer to [facility]    Vitals:    01/24/23 1430   BP: 115/69   Pulse: 79   Resp: 18   Temp: 99 °F (37.2 °C)   SpO2: 95%         Intake/Output Summary (Last 24 hours) at 1/24/2023 1459  Last data filed at 1/24/2023 1430  Gross per 24 hour   Intake 1980 ml   Output --   Net 1980 ml       Pain assessment:    Pain Level: 6 Patient satisfied. Report given to JAVON Solis RN for hold until room is available.     Patient transferred to care of JAVON RN.    1/24/2023 2:59 PM

## 2023-01-24 NOTE — PROGRESS NOTES
From OR drowsy, denies any pain at this time, dressing CDI with ace wrap, ice packs applied, VSS. Report from CRNA and [facility]. Puffy feet and per CRNA, was puffy pre-op. With birthmark on right forehead.     S/P MINIMALLY INVASIVE LEFT TOTAL KNEE ARTHROPLASTY

## 2023-01-24 NOTE — PROGRESS NOTES
Sister Hiral Victor Hugo at bedside to see patient for a few minutes and will head home. Patient's sister states that she didn't get a call from the surgeon post-op so called OR 10 and Shannon Santos RN answered and that Dr. Serafin Ramirez already scrubbed in and will relay message.

## 2023-01-24 NOTE — PROGRESS NOTES
Procedure: isadora CHANDLER: Dr. Yuni Stephens performed. IN: 0830  OUT: 8233    Pt monitored closely on heart monitor, 2L NC, continuous pulse oximetry, EtCO2, and frequent BPs. Pt remained alert and oriented x4. pt tolerated procedure well.

## 2023-01-24 NOTE — ANESTHESIA POSTPROCEDURE EVALUATION
Department of Anesthesiology  Postprocedure Note    Patient: Yan Young  MRN: [id number]  YOB: 1950  Date of evaluation: 1/24/2023      Procedure Summary     Date: 01/24/23 Room / Location: [address] [place] [address] [address]    Anesthesia Start: 1767 Anesthesia Stop: 3118    Procedure: MINIMALLY INVASIVE LEFT TOTAL KNEE ARTHROPLASTY (Left: Knee) Diagnosis:       Primary osteoarthritis of left knee      (Primary osteoarthritis of left knee [M17.12])    Surgeons: Ashtyn Jackson MD Responsible Provider: Carlitos Renteria DO    Anesthesia Type: General, Regional ASA Status: 3          Anesthesia Type: General, Regional    Toni Phase I: Toni Score: 9    Toni Phase II:        Anesthesia Post Evaluation    Patient location during evaluation: PACU  Level of consciousness: awake  Complications: no  Multimodal analgesia pain management approach

## 2023-01-24 NOTE — ANESTHESIA PROCEDURE NOTES
Peripheral Block    Patient location during procedure: pre-op  Reason for block: procedure for pain, post-op pain management and at surgeon's request  Start time: 1/24/2023 9:10 AM  Staffing  Performed: anesthesiologist   Anesthesiologist: Gil Davis DO  Preanesthetic Checklist  Completed: patient identified, IV checked, site marked, risks and benefits discussed, surgical/procedural consents, equipment checked, pre-op evaluation, timeout performed, anesthesia consent given, oxygen available, monitors applied/VS acknowledged, fire risk safety assessment completed and verbalized and blood product R/B/A discussed and consented  Peripheral Block   Patient position: supine  Prep: ChloraPrep  Patient monitoring: cardiac monitor, continuous pulse ox, continuous capnometry, frequent blood pressure checks, IV access and oxygen  Block type: Femoral  Adductor canal  Laterality: left  Injection technique: single-shot  Guidance: ultrasound guided    Needle   Needle gauge: 22 G  Needle localization: anatomical landmarks and ultrasound guidance  Assessment   Outcomes: uncomplicated and patient tolerated procedure well    Additional Notes  Sterile prep. Timeout with SDS RN. 2 mg versed + 100 micrograms fentanyl IV for pt comfort. 30 mL 0.5% Ropivacaine injected in 5 mL increments following negative aspiration. Tip of needle in view at all times. No pain or paresthesias on injection. Pt tolerated the procedure well.

## 2023-01-24 NOTE — H&P
I have reviewed the history and physical and examined the patient and find no relevant changes. I have reviewed with the patient and/or family the risks, benefits, and alternatives to the procedure.     Xenia Almaraz MD  1/24/2023

## 2023-01-24 NOTE — PROGRESS NOTES
Called OR 10 again and [address], RN  answered to follow up if Dr. Clyde Carrillo called family for post-op update and stated that there was no answer, so I updated the cell phone number of patient's sister.

## 2023-01-24 NOTE — DISCHARGE INSTRUCTIONS
[facility]  Total Knee Replacement    Dr. Lore Gutierrez M.D.  [phone]    1. ( x )  Post Discharge Instructions for HOME/SNF   Elevate extremity if swelling occurs  Continue the exercise program as prescribed by PT  Use walker, crutches or cane with weightbearing instructions as indicated by Dr. Marquez Edu not ambulate without assistance until cleared to do so by PT  Use Spirometer every 2 hrs while awake    2. ( x ) Initiate bowel care with the following medications   Over-the-Counter Senokot  (or Over-the-Counter Colace (Docusate Sodium)  1-2 tabs by mouth twice daily, continue while on narcotics, hold for loose stools. 3. ( x  ) Admit s/p   (   ) right    ( x  ) left    ( x ) Minimally Invasive TKA    4. ( x  ) Physical Therapy Orders    Range-of-Motion, strengthening, gait training, ADL's. May discontinue therapy when full extension is obtained and flexion is greater than 120 degrees. (    x  )  Home physical therapy 2 times per week for 3 weeks until follow-up in my clinic. Will transition to outpatient physical therapy after follow-up. (     ) Outpatient physical therapy 3 times per week for 6 weeks. 5. ( x )  Weight bearing/limitations      (   )right  (  x ) left    ( x  ) lower extremity        ( x ) Full weight bearing  (   ) Non Weight Bearing   (   ) partial WB-         %      6.( x  ) Dressing/wound care    You may remove your ACE bandage on post-op day #1. It is placed for compression for the first 24 hours post-op. You may loosen it if it becomes too tight. ( x ) Remove Mepilex (the large clear dressing with a silver strip in the middle) dressing on post-op day 7.     - It is important to keep your incision covered for 2 weeks after surgery. After the Mepilex (large clear dressing) dressing is removed on post-op day 7, cover your incision with sterile gauze and tegaderm for another 7 days.  You may change this dressing as needed when moist/wet. - There is a mesh called Prineo underneath the larger dressing. This mesh is surgically glued over your incision. Leave this mesh in place until you see Dr. Jocelyn Hsu in the office for your 3 week post-operative appointment. (  ) Remove Prevena on post-operative day # 7. The battery pack attached to the purple dressing will automatically die 7 days after your surgery. A week after your surgery, peel off the dressing like you would a large band-aid and the entire dressing and battery pack may be thrown in the garbage.   - It is important to keep your incision covered for 2 weeks after surgery. After the Purple Prevena dressing is removed on post-op day 7, cover your incision with sterile gauze and tegaderm for another 7 days. You may change this dressing as needed when moist/wet. - If staples were used to close your incision: Staples are safe and may remain for up to 4 weeks post-operatively. These will be removed at your 3 week post-operative appointment. You may shower. No tub baths. Do not scrub wound. Pat dry with soft towel. [facility]   Dr. Jocelyn Hsu does not utilize home healthcare or home nursing. It is not needed after this procedure. 7. (  x ) DVT PROPHYLAXIS -   (  x ) Thigh/knee high marine hose bilateral lower extremities, OFF AT NIGHT  ( x  ) Aspirin 81 mg daily for 4 weeks  (  ) Eliquis (Apixaban) 2.5 mg tablets 2 times a day for 4 weeks  (  )  Xarelto (Rivaroxaban) 10 mg daily for 4 weeks  (   ) If you are taking Xarelto (Rivaroxaban) or Eliquis (Apixaban), start taking Aspirin 325mg 2 x daily the day after you finish your Xarelto (Rivaroxaban) or Eliquis (Apixaban). Continue Aspirin until 6 weeks post op. (   ) Patient's who were on coumadin prior to surgery should resume their pre op dose  and discontinue lovenox when INR = 2.0      8. Take all medications ordered from Dr. Ruddy Rai office as directed at your Pre-op Appointment.     [phone]  Follow up appointment with Dr. Deana Byrd in 3 weeks    ELECTRONICALLY SIGNED BY: ROX Mccord, 1/24/2023         [facility] is participating in [facility] for Joint Replacement (CJR) [address] is participating in a Medicare initiative called the [facility] for Joint Replacement ([address]) model. Medicare designed this model to encourage higher quality care and greater financial accountability from hospitals when Medicare beneficiaries receive lower-extremity joint replacement procedures (Wilfredo Quest), typically hip or knee replacements. [facility] participation in the [address] [place] [address],[address] [place] [address] should not restrict your access to care for your medical condition or your freedom to choose your health care providers and services. All existing Medicare beneficiary protections continue to be available to you. The CJR model aims to help give you better care. The CJR model aims to support better and more efficient care for beneficiaries undergoing LEJR procedures. A CJR episode of care is typically defined as an admission of an eligible Medicare beneficiary to a hospital participating in the [address] [place] [address],[address] [place] [address] for an Wilfredo Quest procedure. The LEJR procedure can take place in either an inpatient or outpatient setting and will still be considered a CJR episode of care. The CJR episode of care continues for 90 days following discharge. This model uses episode payment and quality measurement for an episode of care associated with LEJR procedures to encourage hospitals, physicians, and post-acute care providers to work together to improve the quality and coordination of care from the initial hospitalization through recovery. Under the CJR model, {CJR Participating Hospitals:[id number]} can earn additional payments from Medicare if we meet the high quality goals set by Medicare, while keeping hospital costs and care spending under control.  If we dont meet these quality and cost goals, we may have to repay Medicare. Medicare is using the CJR model to encourage [facility] to work more closely with your doctors and other health care providers that help patients recover after discharge from the hospital including, but not limited to, nursing homes (skilled nursing facilities), home health agencies, inpatient rehabilitation facilities, and long- term care hospitals. If you require a stay in a [facility] (SNF) to assist with your recovery from surgery and if, and only if, it is clinically appropriate, the CJR model permits [facility] to discharge you to a high quality SNF sooner than the three days Medicare usually requires to cover a SNF stay. Medicare will monitor your care to ensure you and others are receiving high quality care. It's your choice which hospital, doctor, or other providers you use. You have the right to choose which hospital, doctor, or other post-hospital stay health care provider you use. If you believe that your care is adversely affected or have concerns about substandard care, you may call 1-800-MEDICARE or contact your [facility] Quality Improvement Organization by going to: Elva.kat. To find a different doctor, visit [address] Physician Compare website, Tabblo.co.nz, or call 1-800-MEDICARE (8-116.957.5258). TTY users should call 5-934.539.7804. To find a different hospital, visit GradeStack.be, or  call 1-800-MEDICARE (1- 702.128.1550). TTY users should call  0-694.537.3789. To find a different skilled nursing facility, visit [address] website, [email or web address]/, or call  1-800-MEDICARE (4-872.512.4378). TTY users should call 1-453-730- 4834.     To find a different home health agency, visit [address] [place] [address] website, Awes[employer] WorldWideConnections.[email or web address].uk, or call 1-800-MEDICARE (1-326.761.7622). TTY users should call 0-023-894- 5403. For an explanation of how patients can access their health care records and beneficiary claims data, please visit [email or web address]- [email or web address]    Get more information     If you have questions or want more information about the [employer] for Joint Replacement (CJR) model, call [facility]  or call 1-800-MEDICARE. You can also find additional information at https://innovation.cms.gov/initiatives/cjr.

## 2023-01-25 LAB
ESTIMATED AVERAGE GLUCOSE: 102.5 MG/DL
HBA1C MFR BLD: 5.2 %
HCT VFR BLD CALC: 34.8 % (ref 40.5–52.5)
HEMOGLOBIN: 11.2 G/DL (ref 13.5–17.5)

## 2023-01-25 PROCEDURE — 97535 SELF CARE MNGMENT TRAINING: CPT

## 2023-01-25 PROCEDURE — G0378 HOSPITAL OBSERVATION PER HR: HCPCS

## 2023-01-25 PROCEDURE — 97530 THERAPEUTIC ACTIVITIES: CPT

## 2023-01-25 PROCEDURE — 6360000002 HC RX W HCPCS: Performed by: ORTHOPAEDIC SURGERY

## 2023-01-25 PROCEDURE — 6370000000 HC RX 637 (ALT 250 FOR IP): Performed by: PHYSICIAN ASSISTANT

## 2023-01-25 PROCEDURE — 97110 THERAPEUTIC EXERCISES: CPT

## 2023-01-25 PROCEDURE — 85014 HEMATOCRIT: CPT

## 2023-01-25 PROCEDURE — 94640 AIRWAY INHALATION TREATMENT: CPT

## 2023-01-25 PROCEDURE — 36415 COLL VENOUS BLD VENIPUNCTURE: CPT

## 2023-01-25 PROCEDURE — 97161 PT EVAL LOW COMPLEX 20 MIN: CPT

## 2023-01-25 PROCEDURE — 6360000002 HC RX W HCPCS: Performed by: PHYSICIAN ASSISTANT

## 2023-01-25 PROCEDURE — 2580000003 HC RX 258: Performed by: PHYSICIAN ASSISTANT

## 2023-01-25 PROCEDURE — 97116 GAIT TRAINING THERAPY: CPT

## 2023-01-25 PROCEDURE — 85018 HEMOGLOBIN: CPT

## 2023-01-25 PROCEDURE — 97165 OT EVAL LOW COMPLEX 30 MIN: CPT

## 2023-01-25 RX ADMIN — METHOCARBAMOL 500 MG: 500 TABLET ORAL at 13:39

## 2023-01-25 RX ADMIN — ACETAMINOPHEN 650 MG: 325 TABLET ORAL at 20:25

## 2023-01-25 RX ADMIN — IPRATROPIUM BROMIDE 2 SPRAY: 21 SPRAY, METERED NASAL at 09:00

## 2023-01-25 RX ADMIN — GABAPENTIN 400 MG: 400 CAPSULE ORAL at 20:25

## 2023-01-25 RX ADMIN — ASPIRIN 81 MG: 81 TABLET, COATED ORAL at 20:25

## 2023-01-25 RX ADMIN — OXYCODONE HYDROCHLORIDE 5 MG: 5 TABLET ORAL at 18:21

## 2023-01-25 RX ADMIN — ASPIRIN 81 MG: 81 TABLET, COATED ORAL at 08:37

## 2023-01-25 RX ADMIN — METHOCARBAMOL 500 MG: 500 TABLET ORAL at 18:16

## 2023-01-25 RX ADMIN — METHOCARBAMOL 500 MG: 500 TABLET ORAL at 08:38

## 2023-01-25 RX ADMIN — GABAPENTIN 400 MG: 400 CAPSULE ORAL at 08:38

## 2023-01-25 RX ADMIN — CEFAZOLIN 2000 MG: 2 INJECTION, POWDER, FOR SOLUTION INTRAMUSCULAR; INTRAVENOUS at 05:32

## 2023-01-25 RX ADMIN — SULINDAC 150 MG: 150 TABLET ORAL at 20:35

## 2023-01-25 RX ADMIN — SODIUM CHLORIDE, PRESERVATIVE FREE 10 ML: 5 INJECTION INTRAVENOUS at 20:25

## 2023-01-25 RX ADMIN — OXYCODONE HYDROCHLORIDE 5 MG: 5 TABLET ORAL at 05:31

## 2023-01-25 RX ADMIN — FUROSEMIDE 20 MG: 20 TABLET ORAL at 08:38

## 2023-01-25 RX ADMIN — Medication 100 MG: at 08:38

## 2023-01-25 RX ADMIN — ATORVASTATIN CALCIUM 20 MG: 20 TABLET, FILM COATED ORAL at 20:25

## 2023-01-25 RX ADMIN — ACETAMINOPHEN 650 MG: 325 TABLET ORAL at 08:37

## 2023-01-25 RX ADMIN — ARFORMOTEROL TARTRATE 15 MCG: 15 SOLUTION RESPIRATORY (INHALATION) at 20:38

## 2023-01-25 RX ADMIN — BUDESONIDE 500 MCG: 0.5 INHALANT RESPIRATORY (INHALATION) at 08:01

## 2023-01-25 RX ADMIN — BUDESONIDE 500 MCG: 0.5 INHALANT RESPIRATORY (INHALATION) at 20:38

## 2023-01-25 RX ADMIN — BRIMONIDINE TARTRATE 1 DROP: 2 SOLUTION/ DROPS OPHTHALMIC at 08:39

## 2023-01-25 RX ADMIN — IPRATROPIUM BROMIDE 2 SPRAY: 21 SPRAY, METERED NASAL at 13:41

## 2023-01-25 RX ADMIN — OXYCODONE HYDROCHLORIDE 5 MG: 5 TABLET ORAL at 13:44

## 2023-01-25 RX ADMIN — SULINDAC 150 MG: 150 TABLET ORAL at 08:40

## 2023-01-25 RX ADMIN — OXYCODONE HYDROCHLORIDE 5 MG: 5 TABLET ORAL at 22:15

## 2023-01-25 RX ADMIN — PANTOPRAZOLE SODIUM 40 MG: 40 TABLET, DELAYED RELEASE ORAL at 05:31

## 2023-01-25 RX ADMIN — ACETAMINOPHEN 650 MG: 325 TABLET ORAL at 13:39

## 2023-01-25 RX ADMIN — POTASSIUM CHLORIDE 10 MEQ: 750 TABLET, EXTENDED RELEASE ORAL at 08:37

## 2023-01-25 RX ADMIN — IPRATROPIUM BROMIDE 2 SPRAY: 21 SPRAY, METERED NASAL at 20:25

## 2023-01-25 RX ADMIN — METHOCARBAMOL 500 MG: 500 TABLET ORAL at 20:25

## 2023-01-25 RX ADMIN — LATANOPROST 1 DROP: 50 SOLUTION/ DROPS OPHTHALMIC at 20:26

## 2023-01-25 RX ADMIN — ARFORMOTEROL TARTRATE 15 MCG: 15 SOLUTION RESPIRATORY (INHALATION) at 08:01

## 2023-01-25 RX ADMIN — AMLODIPINE BESYLATE 5 MG: 5 TABLET ORAL at 08:38

## 2023-01-25 RX ADMIN — SODIUM CHLORIDE, PRESERVATIVE FREE 10 ML: 5 INJECTION INTRAVENOUS at 08:38

## 2023-01-25 ASSESSMENT — PAIN - FUNCTIONAL ASSESSMENT
PAIN_FUNCTIONAL_ASSESSMENT: PREVENTS OR INTERFERES SOME ACTIVE ACTIVITIES AND ADLS

## 2023-01-25 ASSESSMENT — PAIN DESCRIPTION - ORIENTATION
ORIENTATION: LEFT

## 2023-01-25 ASSESSMENT — PAIN DESCRIPTION - DESCRIPTORS
DESCRIPTORS: ACHING;DISCOMFORT

## 2023-01-25 ASSESSMENT — PAIN DESCRIPTION - FREQUENCY
FREQUENCY: CONTINUOUS

## 2023-01-25 ASSESSMENT — PAIN SCALES - GENERAL
PAINLEVEL_OUTOF10: 6
PAINLEVEL_OUTOF10: 0
PAINLEVEL_OUTOF10: 7
PAINLEVEL_OUTOF10: 6
PAINLEVEL_OUTOF10: 8
PAINLEVEL_OUTOF10: 6
PAINLEVEL_OUTOF10: 8
PAINLEVEL_OUTOF10: 8
PAINLEVEL_OUTOF10: 4
PAINLEVEL_OUTOF10: 9

## 2023-01-25 ASSESSMENT — PAIN DESCRIPTION - PAIN TYPE
TYPE: ACUTE PAIN;SURGICAL PAIN
TYPE: SURGICAL PAIN
TYPE: SURGICAL PAIN

## 2023-01-25 ASSESSMENT — PAIN DESCRIPTION - ONSET
ONSET: ON-GOING

## 2023-01-25 ASSESSMENT — PAIN DESCRIPTION - LOCATION
LOCATION: KNEE

## 2023-01-25 NOTE — PROGRESS NOTES
Pt a/o x4. VSS. Denies N/V. Neuro checks WDL. Pt reported pain level of a 8 on a scale of 1-10. Pt medicated w/ prn oxycodone and Pt reported relief. Pt ambulating x1 assist w/ walker. Will continue to monitor patient.      Kendy Gore RN

## 2023-01-25 NOTE — CONSULTS
Hospitalist Consultation Note    Patient's PCP: Dr. Chayito Selby MD     Requesting Physician: Dr. Radha Coyle MD    Reason for Consultation: Perioperative medical management    HISTORY OF PRESENT ILLNESS:    This is a very pleasant 74 y.o. male with a history of hypertension, COPD, GERD, peptic ulcer, and sleep apnea who we have been asked to see to assist with perioperative medical management. He was in the OR 1/24/2023 for minimally invasive left total knee arthroplasty, under general and general anesthesia. Prior to and since surgery, he has not had any chest pain shortness of breath dizziness or lightheadedness. Reviewed vitals and labs as well as discussed case with nursing staff. Past Medical History:   Diagnosis Date    Acid reflux     Arthritis     Asthma     COPD (chronic obstructive pulmonary disease) ([facility] [address]     History of blood transfusion     Hypertension     Peptic ulcer     Scarring of lung     2/3 right lung scarred close    Sleep apnea     uses CPAP       Past Surgical History:   Procedure Laterality Date    CHOLECYSTECTOMY      GASTRIC BYPASS SURGERY      SPLENECTOMY      UPPER GASTROINTESTINAL ENDOSCOPY N/A 02/23/2022    ESOPHAGOGASTRODUODENOSCOPY -SLEEP APNEA performed by Andrea Wang MD at [address]       Medications Prior to Admission:    No current facility-administered medications on file prior to encounter. Current Outpatient Medications on File Prior to Encounter   Medication Sig Dispense Refill    esomeprazole (NEXIUM) 20 MG delayed release capsule Take 20 mg by mouth every morning (before breakfast)      ipratropium (ATROVENT) 0.03 % nasal spray 2 sprays by Nasal route 3 times daily      cefadroxil (DURICEF) 500 MG capsule  (Patient not taking: Reported on 1/20/2023)      fluticasone (FLONASE) 50 MCG/ACT nasal spray       gabapentin (NEURONTIN) 400 MG capsule 2 times daily.  1 AM 2 HS      sulindac (CLINORIL) 150 MG tablet  (Patient not taking: No sig reported)      oxyCODONE-acetaminophen (PERCOCET)  MG per tablet       latanoprost (XALATAN) 0.005 % ophthalmic solution Place 1 drop into both eyes nightly      aspirin 81 MG EC tablet Take 81 mg by mouth daily      Magnesium Citrate 100 MG TABS Take by mouth daily       Omega-3 Fatty Acids (FISH OIL) 1000 MG CAPS Take by mouth daily      albuterol sulfate  (90 Base) MCG/ACT inhaler Inhale 2 puffs into the lungs every 6 hours as needed      atorvastatin (LIPITOR) 20 MG tablet TAKE ONE TABLET BY MOUTH DAILY      brimonidine (ALPHAGAN) 0.2 % ophthalmic solution Place 1 drop into both eyes daily       budesonide-formoterol (SYMBICORT) 160-4.5 MCG/ACT AERO INHALE TWO PUFFS BY MOUTH TWICE A DAY      omeprazole (PRILOSEC) 10 MG capsule Take 10 mg by mouth daily. cetirizine (ZYRTEC) 10 MG tablet Take 10 mg by mouth daily. zolpidem (AMBIEN) 10 MG tablet Take 10 mg by mouth nightly as needed. (Patient not taking: Reported on 1/24/2023)      furosemide (LASIX) 20 MG tablet Take 20 mg by mouth daily. potassium chloride (KLOR-CON) 10 MEQ CR tablet Take 10 mEq by mouth daily. amlodipine (NORVASC) 5 MG tablet Take 5 mg by mouth daily 1/2 TABLET      oxycodone-acetaminophen (PERCOCET) 5-325 MG per tablet Take 2 tablets by mouth every 6 hours as needed. (Patient not taking: Reported on 1/20/2023)         Allergies:  Dairycare [lactase-lactobacillus], Ciprofloxacin, Sudafed [pseudoephedrine hcl], and Sulfa antibiotics    Social History:   TOBACCO:   reports that he quit smoking about 10 years ago. His smoking use included pipe. He has never used smokeless tobacco.     ETOH:   reports that he does not currently use alcohol. Family History:       Problem Relation Age of Onset    Diabetes Mother     High Blood Pressure Mother     High Blood Pressure Father        ROS: Review of Systems - Negative except as in HPI. .   All other systems reviewed and are negative.     PHYSICAL EXAM:  BP 134/74   Pulse 83   Temp 98.3 °F (36.8 °C) (Oral)   Resp 14   Ht 5' 7\" (1.702 m)   Wt 246 lb 3.2 oz (111.7 kg)   SpO2 94%   BMI 38.56 kg/m²     Recent Labs     01/24/23  0756   POCGLU 97       General appearance: alert, appears stated age, and cooperative  Head: Normocephalic, without obvious abnormality, atraumatic  Neck: no adenopathy, no carotid bruit, no JVD, supple, symmetrical, trachea midline, and thyroid not enlarged, symmetric, no tenderness/mass/nodules  Lungs: clear to auscultation bilaterally  Chest wall: no tenderness  Abdomen: soft, non-tender; bowel sounds normal; no masses,  no organomegaly  Pulses: 2+ and symmetric  Neurologic: Grossly normal      LABS:  No results for input(s): WBC, HGB, HCT, PLT in the last 72 hours. No results for input(s): NA, K, CL, CO2, BUN, CREATININE, GLUCOSE in the last 72 hours. Invalid input(s):  CA,  PHOS  No results for input(s): AST, ALT, ALB, BILITOT, ALKPHOS in the last 72 hours. No results for input(s): TROPONINI in the last 72 hours. No results for input(s): BNP in the last 72 hours. No results for input(s): CHOL, HDL in the last 72 hours. Invalid input(s): LDLCALCU  No results for input(s): INR in the last 72 hours. No results for input(s): NITRITE, COLORU, PHUR, LABCAST, WBCUA, RBCUA, MUCUS, TRICHOMONAS, YEAST, BACTERIA, CLARITYU, SPECGRAV, LEUKOCYTESUR, UROBILINOGEN, BILIRUBINUR, BLOODU, GLUCOSEU, AMORPHOUS in the last 72 hours. Invalid input(s): Ambreen Cortez       Assessment & Plan: This is a very pleasant 74 y.o. male       Pain, secondary to left knee osteoarthritis  - OR 1/24/2023: minimally invasive left total knee arthroplasty, under general and general anesthesia.   - Post op care per primary  - Pain control  - Dvt prophylaxis      Hypertension  COPD  GERD  Obstructive sleep apnea   -Home medications as appropriate      We will follow along and manage medical issues as they arise/as needed. Thank you Dr. Jose Lugo MD for the opportunity to be involved in this patients care. If you have any questions or concerns please feel free to contact me at [phone].

## 2023-01-25 NOTE — PROGRESS NOTES
PACU Transfer Note    Vitals:    01/24/23 1930   BP: 114/69   Pulse: 70   Resp: 14   Temp: 97.7 °F (36.5 °C)   SpO2: 95%       No intake/output data recorded.     Pain assessment:  none  Pain Level: 0    Report given to Receiving unit RN.    1/24/2023 7:32 PM

## 2023-01-25 NOTE — CARE COORDINATION
Case Management Assessment  Initial Evaluation    Date/Time of Evaluation: 1/25/2023 2:45 PM  Assessment Completed by: Kelly Vizcaino RN    If patient is discharged prior to next notation, then this note serves as note for discharge by case management. Patient Name: Meche Saunders                   YOB: 1950  Diagnosis: Primary osteoarthritis of left knee [M17.12]  Osteoarthritis of left knee, unspecified osteoarthritis type [M17.12]                   Date / Time: 1/24/2023  6:31 AM    Patient Admission Status: Observation   Readmission Risk (Low < 19, Mod (19-27), High > 27): No data recorded  Current PCP: Deejay Govea MD  PCP verified by CM? No    Chart Reviewed: Yes      History Provided by: Patient  Patient Orientation: Alert and Oriented    Patient Cognition: Alert    Hospitalization in the last 30 days (Readmission):  No    If yes, Readmission Assessment in  Navigator will be completed. Advance Directives:      Code Status: Full Code   Patient's Primary Decision Maker is: Legal Next of Kin      Discharge Planning:    Patient lives with: Alone Type of Home: Apartment  Primary Care Giver: Self  Patient Support Systems include: Family Members   Current Financial resources: Medicare  Current community resources: None  Current services prior to admission: Durable Medical Equipment            Current DME: Wheelchair, Jodeen Risen, Other (Comment) (lift chair)            Type of Home Care services:  PT, OT    ADLS  Prior functional level: Independent in ADLs/IADLs  Current functional level: Assistance with the following:, Mobility    PT AM-PAC: 17 /24  OT AM-PAC: 18 /24    Family can provide assistance at DC: Yes  Would you like Case Management to discuss the discharge plan with any other family members/significant others, and if so, who?  No  Plans to Return to Present Housing: Unknown at present  Other Identified Issues/Barriers to RETURNING to current housing: weakness  Potential Assistance needed at discharge: Home Care            Potential DME:    Patient expects to discharge to: [facility] for transportation at discharge: Family    Financial    Payor: [address] / Plan: MEDICARE PART A AND B / Product Type: *No Product type* /     Does insurance require precert for SNF: No    Potential assistance Purchasing Medications: No  [facility] [id number] Alyssa Mueller, [address] [facility] Ilya Rubio [phone]  [address] [place] [address]  Phone: [phone] Fax: [phone]      Notes:    Factors facilitating achievement of predicted outcomes: Family support    Barriers to discharge: Pain    Additional Case Management Notes:   Patient is from home alone, independent pta, drives self. Patient has good family support and plans to return home at discharge. Patient is agreeable to [facility] and  before discharge. CM will also make a referral to [facility] prior to discharge. Family to transport. CM spoke with patient's daughter Jan Madrigal on plan for discharge home tomorrow. They may be interested in raised toilet seat along with the RW. The Plan for Transition of Care is related to the following treatment goals of Primary osteoarthritis of left knee [M17.12]  Osteoarthritis of left knee, unspecified osteoarthritis type [D12.92]    IF APPLICABLE: The Patient and/or patient representative Sussy Cam and his family were provided with a choice of provider and agrees with the discharge plan. Freedom of choice list with basic dialogue that supports the patient's individualized plan of care/goals and shares the quality data associated with the providers was provided to: Patient   Patient Representative Name:       The Patient and/or Patient Representative Agree with the Discharge Plan?  Yes    Marleni Sloan RN  Case Management Department  Ph: [phone] Fax: [phone]

## 2023-01-25 NOTE — PROGRESS NOTES
Occupational Therapy  Facility/Department: [facility] 5T ORTHO/NEURO  Occupational Therapy Initial Assessment    Name: Taqueria Pittman  : 1950  MRN: [id number]  Date of Service: 2023    Discharge Recommendations: Taqueria Pittman scored a 18/24 on the AM-PAC ADL Inpatient form. Current research shows that an AM-PAC score of 18 or greater is typically associated with a discharge to the patient's home setting. Based on the patient's AM-PAC score, and their current ADL deficits, it is recommended that the patient have 2-3 sessions per week of Occupational Therapy at d/c to increase the patient's independence. At this time, this patient demonstrates the endurance and safety to discharge home with (home vs OP services) and a follow up treatment frequency of 2-3x/wk. Please see assessment section for further patient specific details. If patient discharges prior to next session this note will serve as a discharge summary. Please see below for the latest assessment towards goals. OT Equipment Recommendations  Equipment Needed: No       Patient Diagnosis(es): There were no encounter diagnoses. Past Medical History:  has a past medical history of Acid reflux, Arthritis, Asthma, COPD (chronic obstructive pulmonary disease) ([facility] [address] History of blood transfusion, Hypertension, Peptic ulcer, Scarring of lung, and Sleep apnea. Past Surgical History:  has a past surgical history that includes Cholecystectomy; Gastric bypass surgery; Splenectomy; Upper gastrointestinal endoscopy (N/A, 2022); and Knee Arthroplasty (Left, 2023). Treatment Diagnosis: impaired mobility, transfers, ADL      Assessment   Performance deficits / Impairments: Decreased functional mobility ; Decreased ADL status; Decreased endurance  Assessment: From home alone, has family and neighbors who can check in but does not have 24hrA. Pt with one level apt, does not have stairs.  Pt completing mobility with CGA this date for fx mobility, CGA to Jose Alfredo with fx transfers, pt reporting limited by pain this date. Pt completing mobility in room, to and from bathroom, stance at sink for grooming, completing toileting. Pt would benefit from cont skilled OT while in acute care and 24hr A at [place] [address]. Will cont POC. Treatment Diagnosis: impaired mobility, transfers, ADL  Decision Making: Low Complexity  REQUIRES OT FOLLOW-UP: Yes  Activity Tolerance  Activity Tolerance: Patient Tolerated treatment well;Patient limited by pain        Plan   Occupational Therapy Plan  Times Per Week: 7     Restrictions  Restrictions/Precautions  Restrictions/Precautions: Fall Risk (high fall risk)  Position Activity Restriction  Other position/activity restrictions: 1)  Ambulate in room progressing to hallway with assistive device four times daily (including PT) when PT advises. 2)  Bathroom privileges with assistance. 3) Up in bedside chair at least TID as tolerated; full WBAT; adult diet - regular    Subjective   General  Chart Reviewed: Yes  Additional Pertinent Hx: 74 y.o. male with a history of hypertension, COPD, GERD, peptic ulcer, and sleep apnea. He was in the OR 1/24/2023 for minimally invasive left total knee arthroplasty, under general and general anesthesia. MINIMALLY INVASIVE LEFT TOTAL KNEE ARTHROPLASTY (Left: Knee)  Referring Practitioner: ROX Sue  Diagnosis: OA L knee  Subjective  Subjective:  In chair agreeable to session, reporting pain 8/10     Social/Functional History  Social/Functional History  Lives With: Alone (can have check in's from family but not 24hr, 80 y.o sister is his duplex neighbor)  Type of Home: Apartment (duplex apartment)  Home Layout: One level  Home Access: Ramped entrance  Bathroom Shower/Tub: Tub/Shower unit  Bathroom Toilet: [address]: Brent Necessary, 4 wheeled  Has the patient had two or more falls in the past year or any fall with injury in the past year?:  (last fall in Aug. knee buckling)  ADL Assistance: Independent  Homemaking Assistance: Independent (clicklist for groceries)  Ambulation Assistance: Independent (avoided community distances; used scooter for work, used cane for short distances)  Transfer Assistance: Independent  Active : Yes  Occupation: Part time employment  Type of Occupation:  at [employer], uses scooter                    Safety Devices  Type of Devices: All fall risk precautions in place;Call light within reach; Chair alarm in place;Gait belt;Left in chair;Nurse notified; Patient at risk for falls  Bed Mobility Training  Bed Mobility Training: No (in chair)  Balance  Sitting: Intact  Standing: With support  Transfer Training  Transfer Training: Yes  Sit to Stand: Contact-guard assistance;Minimum assistance  Stand to Sit: Contact-guard assistance  Toilet Transfer: Contact-guard assistance  Gait  Overall Level of Assistance: Contact-guard assistance  Assistive Device: Walker, rolling;Gait belt     AROM: Generally decreased, functional (UE)  Strength: Generally decreased, functional (UE)  ADL  Feeding: Independent  Grooming: Stand by assistance  UE Dressing: Stand by assistance  LE Dressing: Maximum assistance (w/o AD.  Pt has sock aid, reacher, shoe horn at home, uses daily and is familiar with AE)  Toileting: Stand by assistance              Vision  Vision: Impaired  Vision Exceptions: Wears glasses for reading (glaucoma, previous CVA effected right eye - peripheral vision only)  Hearing  Hearing: Within functional limits  Cognition  Overall Cognitive Status: WFL  Orientation  Overall Orientation Status: Within Functional Limits  Orientation Level: Oriented X4                  Education Given To: Patient  Education Provided: Role of Therapy;Plan of Care;Transfer Training;ADL Adaptive Strategies  Education Method: Demonstration;Verbal  Barriers to Learning: None  Education Outcome: Verbalized understanding;Demonstrated understanding AM-PAC Score        AM-PAC Inpatient Daily Activity Raw Score: 18 (01/25/23 1053)  AM-PAC Inpatient ADL T-Scale Score : 38.66 (01/25/23 1053)  ADL Inpatient CMS 0-100% Score: 46.65 (01/25/23 1053)  ADL Inpatient CMS G-Code Modifier : CK (01/25/23 1053)      Goals  Short Term Goals  Time Frame for Short Term Goals: dc  Short Term Goal 1: supine to sit with SBA  Short Term Goal 2: sit<>stand with SBA  Short Term Goal 3: Fx mobility with SBA  Short Term Goal 4: UB/LB dressing SBA + AE  Short Term Goal 5: toileting with SPVN       Therapy Time   Individual Concurrent Group Co-treatment   Time In 0859         Time Out 0952         Minutes 53          Timed Code Treatment Minutes:   38    Total Treatment Minutes:  ALICIA Inman

## 2023-01-25 NOTE — PROGRESS NOTES
HOSPITALISTS PROGRESS NOTE    1/25/2023 9:03 AM        Name: Maddison Tabor . Admitted: 1/24/2023  Primary Care Provider: Zach Yusuf MD (Tel: [phone])      Problem List  Principal Problem:    Osteoarthritis of left knee, unspecified osteoarthritis type  Resolved Problems:    * No resolved hospital problems. *       Assessment & Plan:   Hypertension  On Norvasc 5 mg daily, Lasix 20 mg daily, low blood pressure will hold Lasix for now on IV fluids slightly obese    Hyperlipidemia on Lipitor 20 mg nightly    COPD on Pulmicort and Brovana stable    Left knee arthritis s/p surgery  As needed p.o. opioids for pain control      IV Access: Peripheral  Dowd: No  Diet: ADULT DIET; Regular  Code:Full Code  DVT PPX recommend anticoagulation with heparin or Lovenox  Disposition in the hospital      Brief Course: S/p left knee arthroplasty      CC: Knee pain    Subjective:  .     Patient is sitting in the chair, patient will be working with therapy, patient mentioned that his knee pain is under control, patient blood pressure slightly on the lower side    Reviewed interval ancillary notes    Current Medications  atorvastatin (LIPITOR) tablet 20 mg, Nightly  pantoprazole (PROTONIX) tablet 40 mg, QAM AC  furosemide (LASIX) tablet 20 mg, Daily  gabapentin (NEURONTIN) capsule 400 mg, BID  ipratropium (ATROVENT) 0.03 % nasal spray 2 spray, TID  magnesium oxide (MAG-OX) tablet 100 mg, Daily  potassium chloride (KLOR-CON M) extended release tablet 10 mEq, Daily  0.9 % sodium chloride infusion, Continuous  sodium chloride flush 0.9 % injection 5-40 mL, 2 times per day  sodium chloride flush 0.9 % injection 5-40 mL, PRN  0.9 % sodium chloride infusion, PRN  acetaminophen (TYLENOL) tablet 650 mg, Q6H  oxyCODONE (ROXICODONE) immediate release tablet 5 mg, Q4H PRN  morphine (PF) injection 2 mg, Q2H PRN   Or  morphine (PF) injection 4 mg, Q2H PRN  aspirin EC tablet 81 mg, BID  amLODIPine (NORVASC) tablet 5 mg, Daily  budesonide (PULMICORT) nebulizer suspension 500 mcg, BID   And  arformoterol tartrate (BROVANA) nebulizer solution 15 mcg, BID  methocarbamol (ROBAXIN) tablet 500 mg, 4x Daily  sulindac (CLINORIL) tablet 150 mg (Patient Supplied), BID  brimonidine (ALPHAGAN) 0.2 % ophthalmic solution 1 drop, Daily  latanoprost (XALATAN) 0.005 % ophthalmic solution 1 drop, Nightly        Objective:  /67   Pulse 77   Temp 97.6 °F (36.4 °C) (Oral)   Resp 16   Ht 5' 7\" (1.702 m)   Wt 246 lb 3.2 oz (111.7 kg)   SpO2 95%   BMI 38.56 kg/m²     Intake/Output Summary (Last 24 hours) at 1/25/2023 0903  Last data filed at 1/24/2023 2356  Gross per 24 hour   Intake 2590 ml   Output 525 ml   Net 2065 ml      Wt Readings from Last 3 Encounters:   01/24/23 246 lb 3.2 oz (111.7 kg)   02/18/22 270 lb (122.5 kg)   07/08/11 274 lb (124.3 kg)     Slightly obese  Lungs are clear to auscultation  S1-S2 heard  Abdomen is soft nontender nondistended  Left knee dressing noticed    Labs and Tests:  CBC:   Recent Labs     01/25/23  0639   HGB 11.2*     BMP:  No results for input(s): NA, K, CL, CO2, BUN, CREATININE, GLUCOSE in the last 72 hours. Hepatic: No results for input(s): AST, ALT, ALB, BILITOT, ALKPHOS in the last 72 hours. XR KNEE LEFT (1-2 VIEWS)   Final Result   1. Status post a left total knee arthroplasty.                 Althea Colon MD   1/25/2023 9:03 AM

## 2023-01-25 NOTE — PROGRESS NOTES
Physical Therapy  Facility/Department: [facility]  Physical Therapy Treatment    Name: Kalyan Dao  : 1950  MRN: [id number]  Date of Service: 2023    Discharge Recommendations:  Kalyan Dao scored a 17 on the AM-PAC short mobility form. Current research shows that an AM-PAC score of 18 or greater is typically associated with a discharge to the patient's home setting. Based on the patient's AM-PAC score and their current functional mobility deficits, it is recommended that the patient have 2-3 sessions per week of Physical Therapy at d/c to increase the patient's independence. At this time, this patient demonstrates the endurance and safety to discharge home with home PT and a follow up treatment frequency of 2-3x/wk. Please see assessment section for further patient specific details. HOME HEALTH CARE: LEVEL 3 SAFETY     - Initial home health evaluation to occur within 24-48 hours, in patient home   - Therapy evaluations in home within 24-48 hours of discharge; including DME and home safety   - Frontload therapy 5 days, then 3x a week   - Therapy to evaluate if patient has [address] needs for personal care   -  evaluation within 24-48 hours, includes evaluation of resources and insurance to determine [place], [place], LTC, and Medicaid options     If patient discharges prior to next session this note will serve as a discharge summary. Please see below for the latest assessment towards goals. PT Equipment Recommendations  Equipment Needed: Yes  Mobility Devices: Jaelyn Yeh: Rolling      Patient Diagnosis(es): There were no encounter diagnoses. Past Medical History:  has a past medical history of Acid reflux, Arthritis, Asthma, COPD (chronic obstructive pulmonary disease) ([facility] [address] History of blood transfusion, Hypertension, Peptic ulcer, Scarring of lung, and Sleep apnea.   Past Surgical History:  has a past surgical history that includes Cholecystectomy; Gastric bypass surgery; Splenectomy; Upper gastrointestinal endoscopy (N/A, 02/23/2022); and Knee Arthroplasty (Left, 1/24/2023). Assessment   Body Structures, Functions, Activity Limitations Requiring Skilled Therapeutic Intervention: Decreased functional mobility ; Decreased ROM; Decreased strength;Decreased endurance;Decreased balance; Increased pain  Assessment: Patient demonstrates impaired functional mobility POD#1 s/p left TKA. Patient is typically modified (I) with cane at baseline, lives alone in an apartment with no stairs. Patient does not have 24 hour (A), intermittent (A) from family/friends only. Patient hopeful to return to home tomorrow with home PT and home health services. Patient does not typically negotiate stairs at baseline. Patient now ambulating with RW up to 125ft with CGA. Patient will continue to benefit from additional skilled PT intervention to facilitate safe mobility and to optimize (I) to promote return to prior level of function.   Treatment Diagnosis: impaired functional mobility  Therapy Prognosis: Good  Decision Making: Low Complexity  Barriers to Learning: none  Requires PT Follow-Up: Yes  Activity Tolerance  Activity Tolerance: Patient limited by pain     Plan   Physcial Therapy Plan  General Plan: 2 times a day 7 days a week  Current Treatment Recommendations: Strengthening, ROM, Balance training, Functional mobility training, Endurance training, Gait training, Pain management, Home exercise program, Safety education & training, Patient/Caregiver education & training, Equipment evaluation, education, & procurement, Therapeutic activities  Safety Devices  Type of Devices: Call light within reach, Gait belt, Nurse notified, Chair alarm in place, Left in chair     Restrictions  Restrictions/Precautions  Restrictions/Precautions: Fall Risk (high fall risk)  Position Activity Restriction  Other position/activity restrictions: 1)  Ambulate in room progressing to hallway with assistive device four times daily (including PT) when PT advises. 2)  Bathroom privileges with assistance. 3) Up in bedside chair at least TID as tolerated; full WBAT; adult diet - regular     Subjective   General  Chart Reviewed: Yes  Additional Pertinent Hx: left TKA 1/24/23; PMH: HTN, COPD, GERD, peptic ulcer, sleep apnea  Family / Caregiver Present: No  Referring Practitioner: ROX Tejada  Referral Date : 01/24/23  Diagnosis: left TKA  Follows Commands: Within Functional Limits  General Comment  Comments: Patient supine in bed upon arrival - agreeable to PT. Patient remains hopeful to stay one additional night and return home, does not have 24 hour (A). RN aware. Subjective  Subjective: Patient reports 7-8/10 left knee pain - RN aware.      Cognition   Orientation  Overall Orientation Status: Within Functional Limits  Orientation Level: Oriented X4  Cognition  Overall Cognitive Status: WFL     Objective      Gross Assessment  Sensation: Intact (patient reports chronic tingling (B) hands from \"neck\")     AROM RLE (degrees)  RLE AROM: WFL  AROM LLE (degrees)  LLE General AROM: supine left knee AROM 10 to 80 degrees with min A to increase ROM  Strength RLE  Strength RLE: WFL  Comment: grossly 4/5  Strength LLE  Comment: fair quad set, able to demonstrate SLR without (A), hip ABD without (A), supine hip flexion/heel slide with min A        Bed Mobility Training  Bed Mobility Training: No (in chair)  Balance  Sitting: Intact  Standing: With support  Transfer Training  Transfer Training: Yes  Sit to Stand: Contact-guard assistance;Minimum assistance  Stand to Sit: Contact-guard assistance  Toilet Transfer: Contact-guard assistance  Gait  Overall Level of Assistance: Contact-guard assistance  Assistive Device: Walker, rolling;Gait belt  Bed mobility  Sit to Supine: Minimal assistance ((A) for left LE)  Scooting: Stand by assistance (seated at edge of bed)  Transfers  Sit to Stand: Contact guard assistance (to RW)  Stand to Sit: Contact guard assistance (from RW)  Stand Pivot Transfers: Contact guard assistance (to RW; min A toilet transfer with RW - difficult to stand from standard height)  Comment: reviewed DME for bathroom including BSC to increase height of toilet or utilize as BSC if unable to safely access bathroom once home without (A)  Ambulation  Surface: Level tile  Device: Rolling Walker  Assistance: Contact guard assistance  Quality of Gait: step to gait, decreased left knee flexion during swing, left knee flexed during left single leg stance, no knee buckling, decreased (B) step length, foot clearance, and heel strike; slow jigna, effortful, limited by pain  Distance: 125ft, 15ft     Balance  Posture: Fair (forward flexed, rounded shoulders)  Sitting - Static: Good  Sitting - Dynamic: Good  Standing - Static: Fair  Standing - Dynamic: Fair;-  Comments: CGA with RW for standing balance during mobility  Exercise Treatment: (B) ankle pumps x 20, left quad sets x 10 with cues to improve contraction, left hip ABD x 10, left SAQ x 10 with min A for terminal knee extension, left heel slides with min A x 10 to increase knee flexion, left SLR x 10 without (A)/SBA    AM-PAC Score  AM-PAC Inpatient Mobility Raw Score : 17 (01/25/23 1438)  AM-PAC Inpatient T-Scale Score : 42.13 (01/25/23 1438)  Mobility Inpatient CMS 0-100% Score: 50.57 (01/25/23 1438)  Mobility Inpatient CMS G-Code Modifier : CK (01/25/23 1438)      Goals  Short Term Goals  Time Frame for Short Term Goals: discharge - all goals ongoing 1/25  Short Term Goal 1: Pt. will demonstrate modified (I) bed mobility  Short Term Goal 2: Pt. will demonstrate sit <-> stand modified (I) with LRAD  Short Term Goal 3: Pt. will demonstrate stand pivot modified (I) with LRAD  Short Term Goal 4: Pt. will ambulate >/= 150ft with LRAD and (S)  Short Term Goal 5: Pt. will demonstrate 0 to 90 degrees left knee flexion  Patient Goals   Patient Goals : \"to go home, hopefully tomorrow\" Education  Patient Education  Education Given To: Patient  Education Provided: Role of Therapy;Plan of Care;Home Exercise Program  Education Provided Comments: use of call light, PT recommendations  Education Method: Demonstration;Verbal  Barriers to Learning: None  Education Outcome: Verbalized understanding;Demonstrated understanding      Therapy Time   Individual Concurrent Group Co-treatment   Time In 1350         Time Out 1432         Minutes 42                 Timed Code Treatment Minutes: 42 minutes    Total Treatment Minutes: 42 Minutes    If patient discharges prior to next treatment, this note will serve as discharge summary.     Leonel León PT, DPT #[id number]

## 2023-01-25 NOTE — PROGRESS NOTES
4 Eyes Admission Assessment     I agree as the admission nurse that 2 RN's have performed a thorough Head to Toe Skin Assessment on the patient. ALL assessment sites listed below have been assessed on admission. Areas assessed by both nurses:   [x]   Head, Face, and Ears- redness to R forehead  [x]   Shoulders, Back, and Chest  [x]   Arms, Elbows, and Hands   [x]   Coccyx, Sacrum, and Ischium  [x]   Legs, Feet, and Heels- sx to LLE        Does the Patient have Skin Breakdown?   No         Phil Prevention initiated:  No   Wound Care Orders initiated:  No      [facility] nurse consulted for Pressure Injury (Stage 3,4, Unstageable, DTI, NWPT, and Complex wounds) or Phil score 18 or lower:  No      Nurse 1 eSignature: Electronically signed by Karen Henderson RN on 1/24/23 at 8:25 PM EST    **SHARE this note so that the co-signing nurse is able to place an eSignature**    Nurse 2 eSignature: Electronically signed by Mary Aggarwal RN on 1/24/23 at 10:51 PM EST

## 2023-01-25 NOTE — DISCHARGE SUMMARY
[facility] DISCHARGE SUMMARY    Pre Operative Diagnosis  Primary osteoarthritis of left knee [M17.12]    Post Operative Diagnosis  Primary osteoarthritis of left knee [M17.12]    Discharge Diagnosis Primary osteoarthritis of left knee [M17.12]    Procedure Preformed  Procedure(s): MINIMALLY INVASIVE LEFT TOTAL KNEE ARTHROPLASTY    Surgeon  Fatuma Joyner MD     Medical course: as per medical records       The patient was taken to the operating room  where the aforementioned procedure was preformed. The patient was taken to the post operative anesthesia recovery unit in stable condition. The patient was then transferred to the orthopaedic floor for post operative pain management and convalesce. ( x )The patient was placed on anticoagulation therapy for DVT prophylaxis       The patient was discharged in stable condition. Please see medical reconciliation for discharge medications. The discharge instructions were explained to the patient and the family. The patient will follow up in the office in 3 weeks for repeat examination and xray .

## 2023-01-25 NOTE — PROGRESS NOTES
Patient returned to PACU from [facility] to wait for a room to be cleaned. VSS. No complaints at this time.

## 2023-01-25 NOTE — PROGRESS NOTES
Physical Therapy  Facility/Department: [facility]  Physical Therapy Initial Assessment    Name: Janet Mars  : 1950  MRN: [id number]  Date of Service: 2023    Discharge Recommendations:  Janet Mars scored a  on the AM-PAC short mobility form. Current research shows that an AM-PAC score of 18 or greater is typically associated with a discharge to the patient's home setting. Based on the patient's AM-PAC score and their current functional mobility deficits, it is recommended that the patient have 2-3 sessions per week of Physical Therapy at d/c to increase the patient's independence. At this time, this patient demonstrates the endurance and safety to discharge home with home PT and a follow up treatment frequency of 2-3x/wk. Please see assessment section for further patient specific details. HOME HEALTH CARE: LEVEL 3 SAFETY     - Initial home health evaluation to occur within 24-48 hours, in patient home   - Therapy evaluations in home within 24-48 hours of discharge; including DME and home safety   - Frontload therapy 5 days, then 3x a week   - Therapy to evaluate if patient has [address] needs for personal care   -  evaluation within 24-48 hours, includes evaluation of resources and insurance to determine AL, [place], LTC, and Medicaid options. If patient discharges prior to next session this note will serve as a discharge summary. Please see below for the latest assessment towards goals. PT Equipment Recommendations  Equipment Needed: Yes  Mobility Devices: Cally Yeh: Rolling      Patient Diagnosis(es): There were no encounter diagnoses. Past Medical History:  has a past medical history of Acid reflux, Arthritis, Asthma, COPD (chronic obstructive pulmonary disease) ([facility] [address] History of blood transfusion, Hypertension, Peptic ulcer, Scarring of lung, and Sleep apnea.   Past Surgical History:  has a past surgical history that includes Cholecystectomy; Gastric bypass surgery; Splenectomy; Upper gastrointestinal endoscopy (N/A, 02/23/2022); and Knee Arthroplasty (Left, 1/24/2023). Assessment   Body Structures, Functions, Activity Limitations Requiring Skilled Therapeutic Intervention: Decreased functional mobility ; Decreased ROM; Decreased strength;Decreased endurance;Decreased balance; Increased pain  Assessment: Patient demonstrates impaired functional mobility POD#1 s/p left TKA. Patient is typically modified (I) with cane at baseline, lives alone in an apartment with no stairs. Patient does not have 24 hour (A), intermittent (A) from family/friends only. Patient hopeful to return to home tomorrow with home PT and home health services. Patient does not typically negotiate stairs at baseline. Patient now ambulating with RW up to 100ft with CGA. Patient will continue to benefit from additional skilled PT intervention to facilitate safe mobility and to optimize (I) to promote return to prior level of function.   Treatment Diagnosis: impaired functional mobility  Therapy Prognosis: Good  Decision Making: Low Complexity  Barriers to Learning: none  Requires PT Follow-Up: Yes  Activity Tolerance  Activity Tolerance: Patient tolerated evaluation without incident;Patient limited by pain     Plan   Physcial Therapy Plan  General Plan: 2 times a day 7 days a week  Current Treatment Recommendations: Strengthening, ROM, Balance training, Functional mobility training, Endurance training, Gait training, Pain management, Home exercise program, Safety education & training, Patient/Caregiver education & training, Equipment evaluation, education, & procurement, Therapeutic activities  Safety Devices  Type of Devices: Bed alarm in place, Call light within reach, Gait belt, Left in bed, Nurse notified     Restrictions  Restrictions/Precautions  Restrictions/Precautions: Fall Risk (high fall risk)  Position Activity Restriction  Other position/activity restrictions: 1)  Ambulate in room progressing to hallway with assistive device four times daily (including PT) when PT advises. 2)  Bathroom privileges with assistance. 3) Up in bedside chair at least TID as tolerated; full WBAT; adult diet - regular     Subjective   General  Chart Reviewed: Yes  Additional Pertinent Hx: left TKA 1/24/23; PMH: HTN, COPD, GERD, peptic ulcer, sleep apnea  Family / Caregiver Present: No  Referring Practitioner: ROX Spence  Referral Date : 01/24/23  Diagnosis: left TKA  Follows Commands: Within Functional Limits  General Comment  Comments: Patient seated in recliner upon arrival - agreeable to PT. Patient planning to d/c home, hopeful to stay one additional night 1/25 to d/c 1/26 to better prepare for d/c home, does not have 24 hour (A), RN aware. Subjective  Subjective: Patient reports 7.5/10 left knee pain upon arrival - RN aware.          Social/Functional History  Social/Functional History  Lives With: Alone (can have check in's from family but not 24hr, 80 y.o sister is his duplex neighbor)  Type of Home: Apartment (duplex apartment)  Home Layout: One level  Home Access: Ramped entrance  Bathroom Shower/Tub: Tub/Shower unit  Bathroom Toilet: Standard  Bathroom Equipment: Shower chair  Home Equipment: Georga Amarilys, Elvia Rudi, 4 wheeled, Lift chair  Has the patient had two or more falls in the past year or any fall with injury in the past year?:  (last fall in Aug. knee buckling)  ADL Assistance: [address] [place] [address]: Independent ([employer] for groceries)  Ambulation Assistance: Independent (avoided community distances; used scooter for work, used cane for short distances)  Transfer Assistance: Independent  Active : Yes  Occupation: Part time employment  Type of Occupation:  at [employer], uses scooter  Additional Comments: sleeps in lift chair at [address] [place] [address] Street: Impaired  Vision Exceptions: Wears glasses for reading (glaucoma, previous CVA effected right eye - peripheral vision only)  Hearing  Hearing: Within functional limits    Cognition   Orientation  Overall Orientation Status: Within Functional Limits  Orientation Level: Oriented X4  Cognition  Overall Cognitive Status: WFL     Objective         Gross Assessment  Sensation: Intact (patient reports chronic tingling (B) hands from \"neck\")     AROM RLE (degrees)  RLE AROM: WFL  AROM LLE (degrees)  LLE General AROM: supine left knee AROM 10 to 75 degrees with min A to increase ROM  Strength RLE  Strength RLE: WFL  Comment: grossly 4/5  Strength LLE  Comment: fair quad set, able to demonstrate SLR without (A), hip ABD without (A), supine hip flexion/heel slide with min A        Bed Mobility Training  Bed Mobility Training: No (in chair)  Balance  Sitting: Intact  Standing: With support  Transfer Training  Transfer Training: Yes  Sit to Stand: Contact-guard assistance;Minimum assistance  Stand to Sit: Contact-guard assistance  Toilet Transfer: Contact-guard assistance  Gait  Overall Level of Assistance: Contact-guard assistance  Assistive Device: Walker, rolling;Gait belt  Bed mobility  Sit to Supine: Minimal assistance ((A) for left LE)  Scooting: Stand by assistance (seated at edge of bed)  Transfers  Sit to Stand: Contact guard assistance (to RW)  Stand to Sit: Contact guard assistance (from [address] [place] [address])  Stand Pivot Transfers: Contact guard assistance (with RW)  Ambulation  Surface: Level tile  Device: Rolling Walker  Assistance: Contact guard assistance  Quality of Gait: step to gait, decreased left knee flexion during swing, left knee flexed during left single leg stance, no knee buckling, decreased (B) step length, foot clearance, and heel strike; slow jigna, effortful, limited by pain  Distance: 100ft     Balance  Posture: Fair (forward flexed, rounded shoulders)  Sitting - Static: Good  Sitting - Dynamic: Good  Standing - Static: Fair  Standing - Dynamic: Fair;-  Comments: CGA with RW for standing balance during mobility  Exercise Treatment: (B) ankle pumps x 20, left quad sets x 10 with cues to improve contraction, left hip ABD x 10, left SAQ x 10 with min A for terminal knee extension, left heel slides with min A x 10 to increase knee flexion, left SLR x 10 without (A)/SBA        AM-PAC Score  AM-PAC Inpatient Mobility Raw Score : 17 (01/25/23 1136)  AM-PAC Inpatient T-Scale Score : 42.13 (01/25/23 1136)  Mobility Inpatient CMS 0-100% Score: 50.57 (01/25/23 1136)  Mobility Inpatient CMS G-Code Modifier : CK (01/25/23 1136)      Goals  Short Term Goals  Time Frame for Short Term Goals: discharge  Short Term Goal 1: Pt. will demonstrate modified (I) bed mobility  Short Term Goal 2: Pt. will demonstrate sit <-> stand modified (I) with LRAD  Short Term Goal 3: Pt. will demonstrate stand pivot modified (I) with LRAD  Short Term Goal 4: Pt. will ambulate >/= 150ft with LRAD and (S)  Short Term Goal 5: Pt. will demonstrate 0 to 90 degrees left knee flexion  Patient Goals   Patient Goals : \"to go home, hopefully tomorrow\"       Education  Patient Education  Education Given To: Patient  Education Provided: Role of Therapy;Plan of Care;Home Exercise Program  Education Provided Comments: use of call light, PT recommendations  Education Method: Demonstration;Verbal  Barriers to Learning: None  Education Outcome: Verbalized understanding;Demonstrated understanding      Therapy Time   Individual Concurrent Group Co-treatment   Time In 1037         Time Out 1115         Minutes 38                 Timed Code Treatment Minutes: 23 minutes    Total Treatment Minutes: 38 Minutes    If patient discharges prior to next treatment, this note will serve as discharge summary.     Alexandrea Michel PT, DPT #[id number]

## 2023-01-25 NOTE — PLAN OF CARE
Problem: Pain  Goal: Verbalizes/displays adequate comfort level or baseline comfort level  Outcome: Progressing   Pain to knee treated with PO and IV medication. Problem: Safety - Adult  Goal: Free from fall injury  Outcome: Progressing  Bed locked in lowest position. Bed alarm on. Knee buckled while standing at bedside. Calls out appropriately. Call light/belongings within reach.

## 2023-01-25 NOTE — PROGRESS NOTES
Patient a/o x4. VSS. Pain to knee treated with PO and IV medication. Voiding adequately. Knee buckled when standing at bedside. Resting well overnight.

## 2023-01-25 NOTE — PROGRESS NOTES
Department of Orthopedic Surgery     Progress Note    Subjective:   Admit Day:1/24/2023    Patient is comfortable appearing. Denies chest pain, shortness of air or calf pain. Tolerating PO. Objective[de-identified]    height is 5' 7\" (1.702 m) and weight is 246 lb 3.2 oz (111.7 kg). His oral temperature is 97.6 °F (36.4 °C). His blood pressure is 110/67 and his pulse is 77. His respiration is 16 and oxygen saturation is 95%. General:  No acute distress. Operative Incision:  Dressing is clean, dry and intact. Operative Extremity:  - Motor function intact to Tibialis Anterior, Gastroc-Soleus Complex, Extensor Hallucis Longus, and Flexor Hallucis Longus.  -  Sensation intact to light touch in sural, saphenous, superficial peroneal, deep peroneal and tibial nerve distributions. -  Toes are wwp with a palpable dorsalis pedis pulse. Negative Ranjan's Sign Bilaterally    Labs:  Lab Results   Component Value Date/Time    HGB 11.2 01/25/2023 06:39 AM    HCT 34.8 01/25/2023 06:39 AM       No results found for: INR    No results found for: NA, K, CHLORIDE, CO2, BUN, CREATININE, CALCIUM    Assessment:   Principal Problem:    Osteoarthritis of left knee, unspecified osteoarthritis type  Resolved Problems:    * No resolved hospital problems. *      POD # 1 s/p Left total knee arthroplasty. Doing well. Plan:   1. Weightbearing as tolerated   2. DVT Prophylaxis:  ASA  3. PT/OT per protocol  4. Pain control: per protocol    Disposition:  Home vs SNF pending mobility/progress with therapy and medical stability.

## 2023-01-25 NOTE — PLAN OF CARE
Problem: Pain  Goal: Verbalizes/displays adequate comfort level or baseline comfort level  1/25/2023 1704 by Oliver Sanders  Outcome: Progressing  Note: Pt reported pain level of a 8 on a scale of 1-10. Pt medicated w/ prn oxycodone and Pt reported relief. Problem: Safety - Adult  Goal: Free from fall injury  1/25/2023 1704 by Oliver Sanders  Outcome: Progressing  Note: Pt free from falls/ injury during duration of shift. Fall risk precautions in place.

## 2023-01-26 PROCEDURE — 96376 TX/PRO/DX INJ SAME DRUG ADON: CPT

## 2023-01-26 PROCEDURE — G0378 HOSPITAL OBSERVATION PER HR: HCPCS

## 2023-01-26 PROCEDURE — 97530 THERAPEUTIC ACTIVITIES: CPT

## 2023-01-26 PROCEDURE — 6370000000 HC RX 637 (ALT 250 FOR IP): Performed by: PHYSICIAN ASSISTANT

## 2023-01-26 PROCEDURE — 6360000002 HC RX W HCPCS: Performed by: PHYSICIAN ASSISTANT

## 2023-01-26 PROCEDURE — 2580000003 HC RX 258: Performed by: PHYSICIAN ASSISTANT

## 2023-01-26 PROCEDURE — 94640 AIRWAY INHALATION TREATMENT: CPT

## 2023-01-26 PROCEDURE — 2700000000 HC OXYGEN THERAPY PER DAY

## 2023-01-26 PROCEDURE — 94761 N-INVAS EAR/PLS OXIMETRY MLT: CPT

## 2023-01-26 PROCEDURE — 96374 THER/PROPH/DIAG INJ IV PUSH: CPT

## 2023-01-26 PROCEDURE — 6360000002 HC RX W HCPCS: Performed by: ORTHOPAEDIC SURGERY

## 2023-01-26 PROCEDURE — 97110 THERAPEUTIC EXERCISES: CPT

## 2023-01-26 PROCEDURE — 97535 SELF CARE MNGMENT TRAINING: CPT

## 2023-01-26 PROCEDURE — 97116 GAIT TRAINING THERAPY: CPT

## 2023-01-26 RX ADMIN — ASPIRIN 81 MG: 81 TABLET, COATED ORAL at 21:39

## 2023-01-26 RX ADMIN — OXYCODONE HYDROCHLORIDE 5 MG: 5 TABLET ORAL at 03:29

## 2023-01-26 RX ADMIN — IPRATROPIUM BROMIDE 2 SPRAY: 21 SPRAY, METERED NASAL at 09:55

## 2023-01-26 RX ADMIN — BUDESONIDE 500 MCG: 0.5 INHALANT RESPIRATORY (INHALATION) at 20:57

## 2023-01-26 RX ADMIN — ACETAMINOPHEN 650 MG: 325 TABLET ORAL at 21:39

## 2023-01-26 RX ADMIN — OXYCODONE HYDROCHLORIDE 5 MG: 5 TABLET ORAL at 10:02

## 2023-01-26 RX ADMIN — ASPIRIN 81 MG: 81 TABLET, COATED ORAL at 09:54

## 2023-01-26 RX ADMIN — ATORVASTATIN CALCIUM 20 MG: 20 TABLET, FILM COATED ORAL at 21:39

## 2023-01-26 RX ADMIN — ARFORMOTEROL TARTRATE 15 MCG: 15 SOLUTION RESPIRATORY (INHALATION) at 20:57

## 2023-01-26 RX ADMIN — SODIUM CHLORIDE, PRESERVATIVE FREE 5 ML: 5 INJECTION INTRAVENOUS at 21:42

## 2023-01-26 RX ADMIN — ARFORMOTEROL TARTRATE 15 MCG: 15 SOLUTION RESPIRATORY (INHALATION) at 07:50

## 2023-01-26 RX ADMIN — PANTOPRAZOLE SODIUM 40 MG: 40 TABLET, DELAYED RELEASE ORAL at 06:21

## 2023-01-26 RX ADMIN — ACETAMINOPHEN 650 MG: 325 TABLET ORAL at 09:53

## 2023-01-26 RX ADMIN — GABAPENTIN 400 MG: 400 CAPSULE ORAL at 09:54

## 2023-01-26 RX ADMIN — ACETAMINOPHEN 650 MG: 325 TABLET ORAL at 03:29

## 2023-01-26 RX ADMIN — SULINDAC 150 MG: 150 TABLET ORAL at 11:23

## 2023-01-26 RX ADMIN — METHOCARBAMOL 500 MG: 500 TABLET ORAL at 21:39

## 2023-01-26 RX ADMIN — Medication 100 MG: at 09:57

## 2023-01-26 RX ADMIN — BUDESONIDE 500 MCG: 0.5 INHALANT RESPIRATORY (INHALATION) at 07:50

## 2023-01-26 RX ADMIN — MORPHINE SULFATE 4 MG: 2 INJECTION, SOLUTION INTRAMUSCULAR; INTRAVENOUS at 06:23

## 2023-01-26 RX ADMIN — IPRATROPIUM BROMIDE 2 SPRAY: 21 SPRAY, METERED NASAL at 15:20

## 2023-01-26 RX ADMIN — SODIUM CHLORIDE, PRESERVATIVE FREE 10 ML: 5 INJECTION INTRAVENOUS at 09:56

## 2023-01-26 RX ADMIN — ACETAMINOPHEN 650 MG: 325 TABLET ORAL at 13:09

## 2023-01-26 RX ADMIN — SULINDAC 150 MG: 150 TABLET ORAL at 21:42

## 2023-01-26 RX ADMIN — METHOCARBAMOL 500 MG: 500 TABLET ORAL at 18:05

## 2023-01-26 RX ADMIN — METHOCARBAMOL 500 MG: 500 TABLET ORAL at 13:09

## 2023-01-26 RX ADMIN — BRIMONIDINE TARTRATE 1 DROP: 2 SOLUTION/ DROPS OPHTHALMIC at 09:56

## 2023-01-26 RX ADMIN — LATANOPROST 1 DROP: 50 SOLUTION/ DROPS OPHTHALMIC at 21:42

## 2023-01-26 RX ADMIN — METHOCARBAMOL 500 MG: 500 TABLET ORAL at 09:55

## 2023-01-26 RX ADMIN — GABAPENTIN 400 MG: 400 CAPSULE ORAL at 21:39

## 2023-01-26 RX ADMIN — POTASSIUM CHLORIDE 10 MEQ: 750 TABLET, EXTENDED RELEASE ORAL at 09:54

## 2023-01-26 RX ADMIN — AMLODIPINE BESYLATE 5 MG: 5 TABLET ORAL at 09:55

## 2023-01-26 RX ADMIN — MORPHINE SULFATE 4 MG: 2 INJECTION, SOLUTION INTRAMUSCULAR; INTRAVENOUS at 00:38

## 2023-01-26 RX ADMIN — IPRATROPIUM BROMIDE 2 SPRAY: 21 SPRAY, METERED NASAL at 21:42

## 2023-01-26 RX ADMIN — OXYCODONE HYDROCHLORIDE 5 MG: 5 TABLET ORAL at 15:34

## 2023-01-26 ASSESSMENT — PAIN DESCRIPTION - PAIN TYPE
TYPE: SURGICAL PAIN
TYPE: SURGICAL PAIN
TYPE: ACUTE PAIN;SURGICAL PAIN

## 2023-01-26 ASSESSMENT — PAIN SCALES - GENERAL
PAINLEVEL_OUTOF10: 6
PAINLEVEL_OUTOF10: 4
PAINLEVEL_OUTOF10: 7
PAINLEVEL_OUTOF10: 0
PAINLEVEL_OUTOF10: 7
PAINLEVEL_OUTOF10: 7
PAINLEVEL_OUTOF10: 6
PAINLEVEL_OUTOF10: 7
PAINLEVEL_OUTOF10: 9

## 2023-01-26 ASSESSMENT — PAIN DESCRIPTION - LOCATION
LOCATION: KNEE
LOCATION: KNEE;NECK
LOCATION: KNEE
LOCATION: KNEE;NECK
LOCATION: KNEE

## 2023-01-26 ASSESSMENT — PAIN DESCRIPTION - FREQUENCY
FREQUENCY: CONTINUOUS

## 2023-01-26 ASSESSMENT — PAIN DESCRIPTION - DESCRIPTORS
DESCRIPTORS: ACHING;DISCOMFORT
DESCRIPTORS: ACHING;DISCOMFORT
DESCRIPTORS: ACHING
DESCRIPTORS: ACHING;DISCOMFORT

## 2023-01-26 ASSESSMENT — PAIN DESCRIPTION - ORIENTATION
ORIENTATION: LEFT

## 2023-01-26 ASSESSMENT — PAIN DESCRIPTION - ONSET
ONSET: ON-GOING

## 2023-01-26 NOTE — PROGRESS NOTES
HOSPITALISTS PROGRESS NOTE    1/26/2023 3:57 PM        Name: Angelia Lion Admitted: 1/24/2023  Primary Care Provider: Nigel Alan MD (Tel: [phone])      Problem List  Principal Problem:    Osteoarthritis of left knee, unspecified osteoarthritis type  Resolved Problems:    * No resolved hospital problems. *       Assessment & Plan:   Hypertension  On Norvasc 5 mg daily,  Stable, continue, monitor. Hyperlipidemia on Lipitor 20 mg nightly    COPD on Pulmicort and Brovana stable    Left knee arthritis s/p surgery  As needed p.o. opioids for pain control      IV Access: Peripheral  Dowd: No  Diet: ADULT DIET; Regular  Code:Full Code  DVT PPX recommend anticoagulation with heparin or Lovenox  Disposition in the hospital      Brief Course: S/p left knee arthroplasty      CC: Knee pain    Subjective:  .     Patient is sitting in the chair, patient will be working with therapy, patient mentioned that his knee pain is under control, patient blood pressure slightly on the lower side    Reviewed interval ancillary notes    Current Medications  atorvastatin (LIPITOR) tablet 20 mg, Nightly  pantoprazole (PROTONIX) tablet 40 mg, QAM AC  [Held by provider] furosemide (LASIX) tablet 20 mg, Daily  gabapentin (NEURONTIN) capsule 400 mg, BID  ipratropium (ATROVENT) 0.03 % nasal spray 2 spray, TID  magnesium oxide (MAG-OX) tablet 100 mg, Daily  potassium chloride (KLOR-CON M) extended release tablet 10 mEq, Daily  0.9 % sodium chloride infusion, Continuous  sodium chloride flush 0.9 % injection 5-40 mL, 2 times per day  sodium chloride flush 0.9 % injection 5-40 mL, PRN  0.9 % sodium chloride infusion, PRN  acetaminophen (TYLENOL) tablet 650 mg, Q6H  oxyCODONE (ROXICODONE) immediate release tablet 5 mg, Q4H PRN  morphine (PF) injection 2 mg, Q2H PRN   Or  morphine (PF) injection 4 mg, Q2H PRN  aspirin EC tablet 81 mg, BID  amLODIPine (NORVASC) tablet 5 mg, Daily  budesonide (PULMICORT) nebulizer suspension 500 mcg, BID   And  arformoterol tartrate (BROVANA) nebulizer solution 15 mcg, BID  methocarbamol (ROBAXIN) tablet 500 mg, 4x Daily  sulindac (CLINORIL) tablet 150 mg (Patient Supplied), BID  brimonidine (ALPHAGAN) 0.2 % ophthalmic solution 1 drop, Daily  latanoprost (XALATAN) 0.005 % ophthalmic solution 1 drop, Nightly      Objective:  /67   Pulse 75   Temp 98.6 °F (37 °C) (Oral)   Resp 16   Ht 5' 7\" (1.702 m)   Wt 246 lb 3.2 oz (111.7 kg)   SpO2 96%   BMI 38.56 kg/m²     Intake/Output Summary (Last 24 hours) at 1/26/2023 1557  Last data filed at 1/26/2023 1305  Gross per 24 hour   Intake 1340 ml   Output 1 ml   Net 1339 ml        Wt Readings from Last 3 Encounters:   01/24/23 246 lb 3.2 oz (111.7 kg)   02/18/22 270 lb (122.5 kg)   07/08/11 274 lb (124.3 kg)     Slightly obese  Lungs are clear to auscultation  S1-S2 heard  Abdomen is soft nontender nondistended  Left knee dressing noticed    Labs and Tests:  CBC:   Recent Labs     01/25/23  0639   HGB 11.2*       BMP:  No results for input(s): NA, K, CL, CO2, BUN, CREATININE, GLUCOSE in the last 72 hours. Hepatic: No results for input(s): AST, ALT, ALB, BILITOT, ALKPHOS in the last 72 hours. XR KNEE LEFT (1-2 VIEWS)   Final Result   1. Status post a left total knee arthroplasty.                 Milton Grajeda MD   1/26/2023 3:57 PM

## 2023-01-26 NOTE — PROGRESS NOTES
Patient a/o x4. VSS. Pain to knee treated with PO and IV medication. Ambulating fairly well with walker/GB. Voiding adequately. Resting well overnight.

## 2023-01-26 NOTE — PROGRESS NOTES
Comprehensive Nutrition Assessment    RECOMMENDATIONS:  PO Diet: Regular  ONS: n/a  Nutrition Education: Diet information provided in discharge notes. NUTRITION ASSESSMENT:   Nutritional summary & status: Consult for BMI >35. Pt is on a Regular diet. Meal intake adequate at %.  lbs. Noted weight of 240 lbs on 1/20;  wt up 6 lbs, likely due to fluid. +3.4 L since admit. No significant weight change per EMR hx. Attempted to visit on this date to provide education on General Healthful Nutrition Therapy, however, occupied with other staff member on visit. Will leave diet information in discharge instructions and follow up at LOS if still inpatient. Admission/PMH: Admit;OA of left knee, unspecified osteoarthritis type   PMHx: COPD, HTN, Gastric Reflux, Peptic Ulcer    MALNUTRITION ASSESSMENT  Context of Malnutrition: Acute Illness   Malnutrition Status: No malnutrition  Findings of the 6 clinical characteristics of malnutrition (Minimum of 2 out of 6 clinical characteristics is required to make the diagnosis of moderate or severe Protein Calorie Malnutrition based on AND/ASPEN Guidelines):  Energy Intake:  No significant decrease in energy intake  Weight Loss:  No significant weight loss     Body Fat Loss:  Unable to assess     Muscle Mass Loss:  Unable to assess    Fluid Accumulation:  No significant fluid accumulation     Strength:  Not Performed    NUTRITION DIAGNOSIS   No nutrition diagnosis at this time related to   as evidenced by      Nutrition Monitoring and Evaluation:   Food/Nutrient Intake Outcomes:  Food and Nutrient Intake  Physical Signs/Symptoms Outcomes:  Biochemical Data, Weight, Meal Time Behavior     OBJECTIVE DATA: Significant to nutrition assessment  Nutrition Related Findings: RLE/ LLE trace edema  Wounds: Surgical Incision (lt knee)  Nutrition Goals: PO intake 75% or greater, prior to discharge     CURRENT NUTRITION THERAPIES  ADULT DIET;  Regular  PO Intake: %   PO Supplement Intake:None Ordered  Additional Sources of Calories/IVF:NS at 125 ml/hr     ANTHROPOMETRICS  Current Height: 5' 7\" (170.2 cm)  Current Weight: 246 lb 3.2 oz (111.7 kg)    Admission weight: 240 lb (108.9 kg)  Ideal Body Weight (IBW): 148 lbs  (67 kg)    Usual Bodyweight     BMI: 38.6    COMPARATIVE STANDARDS  Energy (kcal):        Protein (g):          Fluid (mL/day): The patient will be monitored per nutrition standards of care. Consult dietitian if additional nutrition interventions are needed prior to RD reassessment.      Manjeet Hilliard, [address]:  [phone]  Office:  [phone]

## 2023-01-26 NOTE — PROGRESS NOTES
Pt alert and oriented x4. VSS, on 1.5 L of oxygen. No acute events this shift. Managing pain with meds per MAR and ice packs. Frequent ambulation and repositioning this shift. Pt ambulates to bathroom well. Fall precautions in place, call light within reach. All needs met at this time.

## 2023-01-26 NOTE — PROGRESS NOTES
Occupational Therapy  Occupational Therapy  Daily Treatment Note  Patient Name: Meche Saunders  MRN: [id number]    Chart Reviewed: Yes     Restrictions/Precautions: Fall Risk (high fall risk) Other position/activity restrictions: 1)  Ambulate in room progressing to hallway with assistive device four times daily (including PT) when PT advises. 2)  Bathroom privileges with assistance. 3) Up in bedside chair at least TID as tolerated; full WBAT; adult diet - regular     Additional Pertinent Hx: left TKA 1/24/23; PMH: HTN, COPD, GERD, peptic ulcer, sleep apnea      Diagnosis: OA L knee  Treatment Diagnosis: impaired mobility, transfers, ADL    Subjective: Pt (2L O2) supine in bed upon entry, pleasant and agreeable to therapy session. General Comment: Pt supine to sit SBA - pt reports sleeping in lift chair since wife passed. Pt sit to stand CGA and ambulated ~12 ft SBA/CGA with rw to bathroom toilet - raised toilet with rails and grab bar pt completed toilet transfer SBA. Pt voided but no brief and no hygiene care. Pt in stance at [facility] for ~8 min total at SBA/CGA completed the following: wash hands/wash face/shave/wash buttocks (assist to wash buttocks - baseline pt uses LHS). Pt seated on BSC at [facility] to comb hair/wash UB and don new hospital gown. Pt with sit to stand attempt from [facility] at [facility], pt unable to perform and needed to rest due to leg cramp. Pt sit to stand CGA and ambulated SBA/CGA to recliner ~18ft. Pt stand to sit SBA. Pt used reacher to thread BLEs into brief and to doff  socks. Pt used sock aid to don new  socks but needed assist to don left compression stocking. Pt sit to stand SBA/CGA and pulled up brief in stance SBA. Pt ambulated in room ~10ft x 2 trials SBA/CGA with rw and stand to sit SBA in chair and recliner. Call light in reach and chair alarm on.     Pain: 5/10 LLE and received pain meds prior to therapy    Social/Functional History  Lives With: Alone (can have check in's from family but not 24hr, 80 y.o sister is his duplex neighbor)  Type of Home: Apartment (duplex apartment)  Home Layout: One level  Home Access: Ramped entrance  Bathroom Shower/Tub: Tub/Shower unit  Bathroom Toilet: Standard  Bathroom Equipment: [address]: Claudio Davis, 4 wheeled, Lift chair  Has the patient had two or more falls in the past year or any fall with injury in the past year?:  (last fall in Aug. knee buckling)  ADL Assistance: [address]: Independent (clicklist for groceries)  Ambulation Assistance: Independent (avoided community distances; used scooter for work, used cane for short distances)  Transfer Assistance: Independent  Active : Yes  Occupation: Part time employment  Type of Occupation:  at [employer], uses scooter  Additional Comments: sleeps in lift chair at [facility] 26: [address]: [address] (clicklist for groceries)  Ambulation Assistance: Independent (avoided community distances; used scooter for work, used cane for short distances)  Transfer Assistance: Independent  Additional Comments: sleeps in lift chair at baseline    Objective:    Cognition/Orientation:Ox4, WFL    Bed mobility   Rolling: to right Mod I  Supine to sit: SBA  Sit to Supine: N/A  Scooting: SBA    Functional Mobility   Sit to Stand:SBA/CGA  Stand to Sit:SBA  Bed to Chair Transfer: ambulating SBA/CGA  Commode Transfer:SBA raised with hand rails and grab bar  Other: from [facility] CGA x 2 attempts    ADLs   Grooming: SBA in stance for oral care/wash face/wash hands  Bathing:UB seated Mod I, LB assist with buttocks, pt washed nika area in stance SBA/CGA  UB dressing:hospital gown  LB dressing:reacher to doff  socks and thread brief - SBA to pull up brief.  Pt needed assist to don left compression stocking  Toileting:  Other:    UE Exercises N/A    Activity Tolerance:Good with rest breaks,  2L O2      Patient Education: use of AE for LB dressing, transfer training, d/c rec, role of OT    Safety Devices in Place: Chair alarm on and call light in reach    Assessment: Pt SBA/CGA for all functional mobility and transfers this date. Pt used AE for LB dressing - SBA to pull up brief, did require assist with compression stockings. Pt doesn't have 24 hr assist, sister lives next door but is 81yo and can't physically help pt. Pt would benefit from continued inpt therapy at d/c to maximize functional independence and safety. Continue with POC. If pt were to d/c home would need 24 hr assist and HHOT. Pt reports falling multiple times at home and needing to call the squad 3x to help get him off of the floor. Discharge Recommendations: Julia Adrian scored a 17/24 on the AM-PAC ADL Inpatient form. Current research shows that an AM-PAC score of 17 or less is typically not associated with a discharge to the patient's home setting. Based on the patient's AM-PAC score and their current ADL deficits, it is recommended that the patient have 3-5 sessions per week of Occupational Therapy at d/c to increase the patient's independence. Please see assessment section for further patient specific details. If patient discharges prior to next session this note will serve as a discharge summary. Please see below for the latest assessment towards goals.     Equipment Needs: Pt has all needed AE, will need rw     Therapy Time:   Individual Concurrent Group Co-treatment   Time In  829         Time Out  939         Minutes  70         Timed Code Treatment Minutes:  70 min  Total Treatment Minutes:  70 min     Goals:  Short Term Goals  Time Frame for Short Term Goals: dc  Short Term Goal 1: supine to sit with SBA - goal met 1/26  Short Term Goal 2: sit<>stand with SBA - SBA/CGA continue goal 1/26  Short Term Goal 3: Fx mobility with SBA - SBA/CGA continue goal 1/26  Short Term Goal 4: UB/LB dressing SBA + AE - Min A compression stocking LLE 1/26  Short Term Goal 5: toileting with SPVN - continue goal          Plan:      Times Per Week: 7        If patient is discharged prior to next treatment, this note will serve as the discharge summary.       [place], [address]

## 2023-01-26 NOTE — PLAN OF CARE
Problem: Pain  Goal: Verbalizes/displays adequate comfort level or baseline comfort level  1/26/2023 0203 by Emelyn Griffin RN  Outcome: Progressing   Pain to L knee treated with PO and IV medication. Problem: Safety - Adult  Goal: Free from fall injury  1/26/2023 0203 by Emelyn Griffin, RN  Outcome: Progressing  Calls out appropriately. Bed locked in lowest position. Bed alarm on. Call light/belongings within reach.

## 2023-01-26 NOTE — DISCHARGE INSTR - DIET
General Healthy Eating  Eat at least 5 servings of fruits and vegetables every day. Dont focus only on green vegetables. There are special health benefits to eating blue-purple, yellow, orange, and red vegetables. Eat more legumes (like beans and lentils) and more whole grains. Try meatless alternatives. In place of meat, you can get your protein from eating eggs, fish, poultry, beans, peas, soy-based foods, and nuts/nut butters  Low-fat or fat-free dairy products are also good sources of protein. Keep your salt intake to a minimum (less than 2300 milligrams per day). Avoid adding salt, soy sauce or fish sauce to your food when cooking. Eat freshly prepared meals at home. Processed foods and restaurant foods contain more salt. Fresh fruits and vegetables are the best choices for snacks. When shopping, choose the products with lower sodium content. Limit your daily sugar intake. Sugar can be found in honey, syrups, jelly, fruit juice, and fruit juice concentrate. Limit sugar-sweetened beverages like soda pop and fruit juice, sugary snacks, and candy  Its best to avoid products with added sugar, but if you do eat them, read labels carefully so you know how much sugar is in each portion. It is better to eat unsaturated fats than saturated fats. Avoid trans fats as much as possible. Unsaturated fat is found in fish, avocado, nuts, and oils like sunflower, canola, and olive oils. Saturated fat is found in fatty meat, butter, ice cream, palm and coconut oil, cream, cheese, and lard. Trans fats are found in many processed foods, margarines, fried foods, fast food items, convenience foods like frozen pizza and snack foods, and sweets including pies, cookies, and other pastries. Check nutrition labels.

## 2023-01-26 NOTE — CARE COORDINATION
CM spoke with patient at bedside. He would like to go to a skilled nursing facility at discharge due to being home alone. CM faxed referrals to [facility] per pt request.  No pre-cert if accepted and bed available. SHEREE spoke with Lizzy Briceño at [facility], who has accepted pt and have a bed for pt at discharge, would need to admit before 7pm.  Report [phone], fax [phone]. Will need a hens, nephew to transport. CM left voicemail regarding pt discharge order. CM will follow up on orders in the morning.      Irais Almanzar, RN, BSN,   [phone]

## 2023-01-26 NOTE — DISCHARGE INSTR - COC
Continuity of Care Form    Patient Name: Garcia Ramirez   :  1950  MRN:  [id number]    Admit date:  2023  Discharge date:  23    Code Status Order: Full Code   Advance Directives:   [facility] Documentation       Date/Time Healthcare Directive Type of Healthcare Directive Copy in [address] Agent's Name Healthcare Agent's Phone Number    [phone] Yes, patient has an advance directive for healthcare treatment Living will No, copy requested from [facility] power of  yaya [phone]            Admitting Physician:  Vanessa Moncada MD  PCP: Mortimer Gentles, MD    Discharging Nurse: [facility]/Room#: 1483/8357-43  Discharging Unit Phone Number: [phone]    Emergency Contact:   Extended Emergency Contact Information  Primary Emergency Contact: PETRA ANAYA  [facility]  Home Phone: [phone]  Relation: Brother/Sister  Secondary Emergency Contact: [address] [address]  Mobile Phone: [phone]  Relation: Child    Past Surgical History:  Past Surgical History:   Procedure Laterality Date    CHOLECYSTECTOMY      GASTRIC BYPASS SURGERY      KNEE ARTHROPLASTY Left 2023    MINIMALLY INVASIVE LEFT TOTAL KNEE ARTHROPLASTY performed by Vanessa Moncada MD at [address] [place] [address] 2022    ESOPHAGOGASTRODUODENOSCOPY -SLEEP APNEA performed by Carlos Issa MD at [address]       Immunization History: There is no immunization history on file for this patient.     Active Problems:  Patient Active Problem List   Diagnosis Code    Osteoarthritis of left knee, unspecified osteoarthritis type M17.12       Isolation/Infection:   Isolation            No Isolation          Patient Infection Status       None to display            Nurse Assessment:  Last Vital Signs: /67   Pulse 75   Temp 98.6 °F (37 °C) (Oral)   Resp 16    5' 7\" (1.702 m)   Wt 246 lb 3.2 oz (111.7 kg)   SpO2 96%   BMI 38.56 kg/m²     Last documented pain score (0-10 scale): Pain Level: 7  Last Weight:   Wt Readings from Last 1 Encounters:   01/24/23 246 lb 3.2 oz (111.7 kg)     Mental Status:  orientedx4    IV Access:  - None    Nursing Mobility/ADLs:  Walking   Assisted  Transfer  Assisted  Bathing  Assisted  Dressing  Assisted  Toileting  Assisted  Feeding  Independent  Med Admin  Assisted  Med Delivery   whole with water    Wound Care Documentation and Therapy:  Incision 01/24/23 Knee Anterior; Left (Active)   Dressing Status Clean;Dry; Intact 01/26/23 1500   Dressing/Treatment Dry dressing 01/26/23 1500   Closure Other (Comment) 01/26/23 0329   Incision Assessment Other (Comment) 01/26/23 0329   Drainage Amount None 01/26/23 1500   Odor None 01/26/23 1500   Number of days: 2        Elimination:  Continence: Bowel: Yes  Bladder: Yes  Urinary Catheter: None   Colostomy/Ileostomy/Ileal Conduit: No       Date of Last BM: unknown    Intake/Output Summary (Last 24 hours) at 1/26/2023 1612  Last data filed at 1/26/2023 1305  Gross per 24 hour   Intake 1340 ml   Output 1 ml   Net 1339 ml     I/O last 3 completed shifts: In: 840 [P.O.:840]  Out: 525 [Urine:525]    Safety Concerns:     History of Falls (last 30 days) and At Risk for Falls    Impairments/Disabilities:      None    Nutrition Therapy:  Current Nutrition Therapy:   - Oral Diet:  General    Routes of Feeding: Oral  Liquids: Thin Liquids  Daily Fluid Restriction: no  Last Modified Barium Swallow with Video (Video Swallowing Test): not done    Treatments at the Time of Hospital Discharge:   Respiratory Treatments: oxygen  Oxygen Therapy:  is on oxygen at 2.5-3 L/min per nasal cannula.   Ventilator:    - No ventilator support    Rehab Therapies: Physical Therapy and Occupational Therapy  Weight Bearing Status/Restrictions: No weight bearing restrictions, full weight bearing to LLE  Other Medical Equipment (for information only, NOT a DME order):  walker  Other Treatments: n/a    Patient's personal belongings (please select all that are sent with patient):  Glasses, Dentures uppers, pants, shirt, socks, underwear, shoes, coat, CPAP, phone, phone , wallet    RN SIGNATURE:  Electronically signed by Jj Gross RN on 1/27/23 at 12:26 PM EST    CASE MANAGEMENT/SOCIAL WORK SECTION    Inpatient Status Date: ***    Readmission Risk Assessment Score:  Readmission Risk              Risk of Unplanned Readmission:  0           Discharging to Facility/ Agency   Name: [facility]  Address: [address]  Phone: [phone]        / signature: Electronically signed by Evelyn Yee RN on 1/27/23 at 12:08 PM EST    PHYSICIAN SECTION    Prognosis: Good    Condition at Discharge: Stable    Rehab Potential (if transferring to Rehab): Good    Recommended Labs or Other Treatments After Discharge: n/a    Physician Certification: I certify the above information and transfer of Jair Walsh  is necessary for the continuing treatment of the diagnosis listed and that he requires [facility] for less 30 days. Update Admission H&P: No change in H&P     Hypertension  On Norvasc 5 mg daily,    Hyperlipidemia on Lipitor 20 mg nightly    COPD on Pulmicort and Brovana stable    Left knee arthritis s/p surgery  As needed p.o. opioids for pain control. ASA for DVT ppx.  Follow with Ortho outpatient       PHYSICIAN SIGNATURE:  Electronically signed by Kathleen Eubanks MD on 1/27/23 at 11:52 AM EST

## 2023-01-26 NOTE — PROGRESS NOTES
Physical Therapy  Daily Treatment Note    Discharge Recommendations: Flores Villalobos scored a 17/24 on the AM-PAC short mobility form. Current research shows that an AM-PAC score of 17 or less is typically not associated with a discharge to the patient's home setting. Based on the patient's AM-PAC score and their current functional mobility deficits, it is recommended that the patient have 3-5 sessions per week of Physical Therapy at d/c to increase the patient's independence. Please see assessment section for further patient specific details. Assessment:  Pt with improved gait tolerance this afternoon. Needs lengthy seated rests between walks. Mobility slow and effortful. Continues to have difficulty with sit to stand transfers from chairs/lower surfaces. Pt from home alone and does not have 24 hour assist available. History of several recent falls, and pt needing to call for emergency services multiple times. He is functioning below his baseline due to L TKR. Would benefit from continued IP PT at D/C prior to going home. If pt goes home, will need 24 hour assist for safety, use of wheeled walker for mobility and continued home PT. Equipment Needs: Defer  Other: Wheeled walker if D/Clay home     [facility]: LEVEL 3 SAFETY (If D/Clay home)  - Initial home health evaluation to occur within 24-48 hours, in patient home   - Therapy evaluations in home within 24-48 hours of discharge; including DME and home safety   - Frontload therapy 5 days, then 3x a week   - Therapy to evaluate if patient has [address] needs for personal care   -  evaluation within 24-48 hours, includes evaluation of resources and insurance to determine [place], [place], LTC, and Medicaid options     Chart Reviewed: Yes   Restrictions/Precautions: Fall Risk (high fall risk) Other position/activity restrictions: 1)  Ambulate in room progressing to hallway with assistive device four times daily (including PT) when PT advises.    2) Bathroom privileges with assistance. 3) Up in bedside chair at least TID as tolerated; full WBAT; adult diet - regular   Additional Pertinent Hx: left TKA 1/24/23; PMH: HTN, COPD, GERD, peptic ulcer, sleep apnea      Diagnosis: left TKA   Treatment Diagnosis: impaired functional mobility    Subjective: Pt in bed initially. Agreeable to working with PT. \"I want to do a little more every day. \"    Pain: 7/10 L knee. Also c/o chronic neck pain. \"That's not going to go away. \" Pt reports being medicated ~an hour ago. Ice packs to knee at end of session. Objective:    Exercises  10 reps B ankle pumps, quad sets, glut sets, R hip abd/add (effortful), heel slides (limited knee flexion), SAQ, SLR (min assist)    Bed mobility  Supine to sit: SBA, HOB up partially with use of rail  Scooting: SBA to EOB    Transfers  Sit to stand: CGA from bed; Min assist x 1 from chair; Min assist x 1 from 3:1 commode (over toilet)  Stand to sit: CGA into chair (twice); CGA onto 3:1 commode (over toilet). Decreased eccentric control. Ambulation  Assistance Level: CGA  Assistive device: Wheeled walker  Distance: 60 ft, 90 ft, 20 ft. Seated rests between walks. Quality of gait: Step-to pattern; effortful; antalgic L LE; decreased pace; moderate reliance on walker for support; flexed neck and downward gaze >50% of the time  Other: Pt tends to hold head/neck in R side bend due to chronic neck issues. Balance  Sat EOB with SBA  Static stance with walker SBA  Ambulation with wheeled walker CGA  Sat on commode with Supervision ~5 minutes  Washing/drying hands at sink with CGA    Patient Education  Role of PT. Calling for assist with needs. Expressed understanding. Safety Devices  Pt left with needs in reach. In chair (reclined) with chair alarm on. RN updated.      AM-PAC score  AM-PAC Inpatient Mobility Raw Score : 17  AM-PAC Inpatient T-Scale Score : 42.13  Mobility Inpatient CMS 0-100% Score: 50.57  Mobility Inpatient CMS G-Code Modifier : CK    Goals: (as determined and assessed by primary PT)  Time Frame for Short Term Goals: discharge - all goals ongoing 1/25  Short Term Goal 1: Pt. will demonstrate modified (I) bed mobility   Short Term Goal 2: Pt. will demonstrate sit <-> stand modified (I) with LRAD   Short Term Goal 3: Pt. will demonstrate stand pivot modified (I) with LRAD  Short Term Goal 4: Pt. will ambulate >/= 150ft with LRAD and (S)  Short Term Goal 5: Pt. will demonstrate 0 to 90 degrees left knee flexion    Plan:  Plan: 2 times a day 7 days a week  Current Treatment Recommendations: Strengthening, ROM, Balance training, Functional mobility training, Endurance training, Gait training, Pain management, Home exercise program, Safety education & training, Patient/Caregiver education & training, Equipment evaluation, education, & procurement, Therapeutic activities    Therapy Time    Individual  Concurrent  Group  Co-treatment    Time In  [id number]            Time Out  1418            Minutes  56              Timed Code Treatment Minutes: 56  Total Treatment Minutes: 56    Will continue per plan of care. If patient is discharged prior to next treatment, this note will serve as the discharge summary.     Gloria, [place] #[id number]

## 2023-01-26 NOTE — PROGRESS NOTES
Physical Therapy  Daily Treatment Note    Discharge Recommendations: Enid Peguero scored a 17/24 on the AM-PAC short mobility form. Current research shows that an AM-PAC score of 17 or less is typically not associated with a discharge to the patient's home setting. Based on the patient's AM-PAC score and their current functional mobility deficits, it is recommended that the patient have 3-5 sessions per week of Physical Therapy at d/c to increase the patient's independence. Please see assessment section for further patient specific details. Assessment:  Pt with good effort and participation--seems very motivated to regain strength and independence. Pt needing up to Min assist x 1 for mobility at this time. Limited by L knee discomfort, weakness and fatigue. He is from home alone and doesn't have 24 hour assist available. Prior to admission, pt with history of multiple recent falls and needing to call 911 for assist. Pt is functioning below his baseline s/p L TKR. He would benefit from continued IP PT at D/C prior to returning home alone. If pt goes home, will nee 24 hour assist for safety (does not currently have), use of wheeled walker for mobility and continued home PT.      Equipment Needs: Defer  Other: Wheeled walker if D/Clay home    [facility]: LEVEL 3 SAFETY (If D/Clay home)  - Initial home health evaluation to occur within 24-48 hours, in patient home   - Therapy evaluations in home within 24-48 hours of discharge; including DME and home safety   - Frontload therapy 5 days, then 3x a week   - Therapy to evaluate if patient has [address] needs for personal care   -  evaluation within 24-48 hours, includes evaluation of resources and insurance to determine AL, [place], LTC, and Medicaid options       Chart Reviewed: Yes   Restrictions/Precautions: Fall Risk (high fall risk) Other position/activity restrictions: 1)  Ambulate in room progressing to hallway with assistive device four times daily (including PT) when PT advises. 2)  Bathroom privileges with assistance. 3) Up in bedside chair at least TID as tolerated; full WBAT; adult diet - regular   Additional Pertinent Hx: left TKA 1/24/23; PMH: HTN, COPD, GERD, peptic ulcer, sleep apnea      Diagnosis: left TKA   Treatment Diagnosis: impaired functional mobility    Subjective: Pt in chair initially. Agreeable to working with PT. Reports he is not feeling comfortable with going home at D/C. States prior to admission he was having difficulty with transfers (using lift chair at baseline) and would sometimes get stuck on his commode at home. \"I call 911 when I need help. \" Has had 6 falls in the past year. Was hoping he would be moving better before going home, but doesn't feel like he'll be able \"to do what I need to do for myself\" at this point. Does not have 24 hour assist available. Family will be able to check in daily, but nobody is able to spend the night or stay for lengthy periods of time. Discussed with RN and case management. Pain: 7/10 L knee at start of session. 6/10 at end of session. Ice packs to knee. RN has been addressing with pain meds. Objective:    Transfers  Sit to stand: CGA from chair (twice). Effortful. Stand to sit: CGA into chair; CGA onto bed. Ambulation  Assistance Level: CGA  Assistive device: Wheeled walker  Distance: 70 ft, 60 ft. Lengthy seated rest between walks. Quality of gait: Antalgic L LE; step-to pattern; effortful; decreased pace; moderate reliance on walker for support; no LOB. Other: Distance limited by fatigue and L knee discomfort. Bed mobility  Sit to Supine: Min assist for L LE. HOB up. Scooting: Dependent (Max assist x 2) to scoot towards HOB in supine.      Exercises  10 reps B ankle pumps, quad sets, glut sets, L heel slides, SAQ, hip abd/add    ROM  12-65 degrees L knee AROM    Balance  Static stance with wheeled walker SBA  Ambulation with wheeled walker CGA  Sat EOB with SBA    Patient Education  Role of PT. Calling for assist with needs. Expressed understanding. Safety Devices  Pt left with needs in reach. In bed with bed alarm on. RN updated. AM-PAC score  AM-PAC Inpatient Mobility Raw Score : 17  AM-PAC Inpatient T-Scale Score : 42.13  Mobility Inpatient CMS 0-100% Score: 50.57  Mobility Inpatient CMS G-Code Modifier : CK    Goals: (as determined and assessed by primary PT)  Time Frame for Short Term Goals: discharge - all goals ongoing 1/25  Short Term Goal 1: Pt. will demonstrate modified (I) bed mobility   Short Term Goal 2: Pt. will demonstrate sit <-> stand modified (I) with LRAD   Short Term Goal 3: Pt. will demonstrate stand pivot modified (I) with LRAD  Short Term Goal 4: Pt. will ambulate >/= 150ft with LRAD and (S)  Short Term Goal 5: Pt. will demonstrate 0 to 90 degrees left knee flexion    Plan:  Plan: 2 times a day 7 days a week  Current Treatment Recommendations: Strengthening, ROM, Balance training, Functional mobility training, Endurance training, Gait training, Pain management, Home exercise program, Safety education & training, Patient/Caregiver education & training, Equipment evaluation, education, & procurement, Therapeutic activities    Therapy Time    Individual  Concurrent  Group  Co-treatment    Time In  945            Time Out  1039            Minutes  54              Timed Code Treatment Minutes: 54  Total Treatment Minutes: 54    Will continue in PM per plan of care. If patient is discharged prior to next treatment, this note will serve as the discharge summary.     Gloria [place] #[id number]

## 2023-01-27 VITALS
HEIGHT: 67 IN | HEART RATE: 81 BPM | WEIGHT: 246.2 LBS | TEMPERATURE: 98.8 F | RESPIRATION RATE: 18 BRPM | BODY MASS INDEX: 38.64 KG/M2 | OXYGEN SATURATION: 97 % | DIASTOLIC BLOOD PRESSURE: 70 MMHG | SYSTOLIC BLOOD PRESSURE: 115 MMHG

## 2023-01-27 PROCEDURE — 2580000003 HC RX 258: Performed by: PHYSICIAN ASSISTANT

## 2023-01-27 PROCEDURE — 6370000000 HC RX 637 (ALT 250 FOR IP): Performed by: PHYSICIAN ASSISTANT

## 2023-01-27 PROCEDURE — 97116 GAIT TRAINING THERAPY: CPT

## 2023-01-27 PROCEDURE — G0378 HOSPITAL OBSERVATION PER HR: HCPCS

## 2023-01-27 PROCEDURE — 97535 SELF CARE MNGMENT TRAINING: CPT

## 2023-01-27 PROCEDURE — 97530 THERAPEUTIC ACTIVITIES: CPT

## 2023-01-27 PROCEDURE — 97110 THERAPEUTIC EXERCISES: CPT

## 2023-01-27 RX ORDER — GABAPENTIN 400 MG/1
400 CAPSULE ORAL 2 TIMES DAILY
Qty: 10 CAPSULE | Refills: 3 | Status: SHIPPED | OUTPATIENT
Start: 2023-01-27 | End: 2023-02-01

## 2023-01-27 RX ORDER — SENNA AND DOCUSATE SODIUM 50; 8.6 MG/1; MG/1
1 TABLET, FILM COATED ORAL DAILY
Qty: 30 TABLET | Refills: 0 | Status: SHIPPED | OUTPATIENT
Start: 2023-01-27

## 2023-01-27 RX ORDER — OXYCODONE HYDROCHLORIDE 5 MG/1
5 TABLET ORAL EVERY 4 HOURS PRN
Qty: 18 TABLET | Refills: 0 | Status: SHIPPED | OUTPATIENT
Start: 2023-01-27 | End: 2023-01-30

## 2023-01-27 RX ORDER — ASPIRIN 81 MG/1
81 TABLET ORAL 2 TIMES DAILY
Qty: 30 TABLET | Refills: 3 | Status: SHIPPED | OUTPATIENT
Start: 2023-01-27 | End: 2023-02-26

## 2023-01-27 RX ADMIN — OXYCODONE HYDROCHLORIDE 5 MG: 5 TABLET ORAL at 13:20

## 2023-01-27 RX ADMIN — OXYCODONE HYDROCHLORIDE 5 MG: 5 TABLET ORAL at 08:05

## 2023-01-27 RX ADMIN — GABAPENTIN 400 MG: 400 CAPSULE ORAL at 08:05

## 2023-01-27 RX ADMIN — ASPIRIN 81 MG: 81 TABLET, COATED ORAL at 08:06

## 2023-01-27 RX ADMIN — ACETAMINOPHEN 650 MG: 325 TABLET ORAL at 13:20

## 2023-01-27 RX ADMIN — AMLODIPINE BESYLATE 5 MG: 5 TABLET ORAL at 08:05

## 2023-01-27 RX ADMIN — BRIMONIDINE TARTRATE 1 DROP: 2 SOLUTION/ DROPS OPHTHALMIC at 08:06

## 2023-01-27 RX ADMIN — METHOCARBAMOL 500 MG: 500 TABLET ORAL at 08:05

## 2023-01-27 RX ADMIN — SULINDAC 150 MG: 150 TABLET ORAL at 08:16

## 2023-01-27 RX ADMIN — SODIUM CHLORIDE, PRESERVATIVE FREE 10 ML: 5 INJECTION INTRAVENOUS at 08:15

## 2023-01-27 RX ADMIN — IPRATROPIUM BROMIDE 2 SPRAY: 21 SPRAY, METERED NASAL at 08:06

## 2023-01-27 RX ADMIN — POTASSIUM CHLORIDE 10 MEQ: 750 TABLET, EXTENDED RELEASE ORAL at 08:06

## 2023-01-27 RX ADMIN — OXYCODONE HYDROCHLORIDE 5 MG: 5 TABLET ORAL at 01:14

## 2023-01-27 RX ADMIN — PANTOPRAZOLE SODIUM 40 MG: 40 TABLET, DELAYED RELEASE ORAL at 08:05

## 2023-01-27 RX ADMIN — ACETAMINOPHEN 650 MG: 325 TABLET ORAL at 01:14

## 2023-01-27 RX ADMIN — ACETAMINOPHEN 650 MG: 325 TABLET ORAL at 08:05

## 2023-01-27 RX ADMIN — Medication 100 MG: at 08:05

## 2023-01-27 RX ADMIN — METHOCARBAMOL 500 MG: 500 TABLET ORAL at 13:20

## 2023-01-27 ASSESSMENT — PAIN DESCRIPTION - DESCRIPTORS
DESCRIPTORS: ACHING;SORE
DESCRIPTORS: ACHING;DISCOMFORT
DESCRIPTORS: SORE
DESCRIPTORS: ACHING;DISCOMFORT

## 2023-01-27 ASSESSMENT — PAIN DESCRIPTION - ORIENTATION
ORIENTATION: LEFT

## 2023-01-27 ASSESSMENT — PAIN - FUNCTIONAL ASSESSMENT
PAIN_FUNCTIONAL_ASSESSMENT: PREVENTS OR INTERFERES SOME ACTIVE ACTIVITIES AND ADLS
PAIN_FUNCTIONAL_ASSESSMENT: PREVENTS OR INTERFERES SOME ACTIVE ACTIVITIES AND ADLS

## 2023-01-27 ASSESSMENT — PAIN DESCRIPTION - ONSET
ONSET: ON-GOING
ONSET: ON-GOING

## 2023-01-27 ASSESSMENT — PAIN SCALES - GENERAL
PAINLEVEL_OUTOF10: 6
PAINLEVEL_OUTOF10: 7
PAINLEVEL_OUTOF10: 5
PAINLEVEL_OUTOF10: 3
PAINLEVEL_OUTOF10: 8
PAINLEVEL_OUTOF10: 3

## 2023-01-27 ASSESSMENT — PAIN DESCRIPTION - PAIN TYPE
TYPE: SURGICAL PAIN

## 2023-01-27 ASSESSMENT — PAIN DESCRIPTION - FREQUENCY
FREQUENCY: CONTINUOUS
FREQUENCY: CONTINUOUS

## 2023-01-27 ASSESSMENT — PAIN DESCRIPTION - LOCATION
LOCATION: KNEE

## 2023-01-27 NOTE — CARE COORDINATION
Case Management Assessment            Discharge Note                    Date / Time of Note: 1/27/2023 12:13 PM                  Discharge Note Completed by: Juliette Desai RN    Patient Name: Zohra Tate   YOB: 1950  Diagnosis: Primary osteoarthritis of left knee [M17.12]  Osteoarthritis of left knee, unspecified osteoarthritis type [M17.12]   Date / Time: 1/24/2023  6:31 AM    Current PCP: Marcia Adams MD  Clinic patient: No    Hospitalization in the last 30 days: No    Advance Directives:  Code Status: Full Code  [address] DNR form completed and on chart: Not Indicated    Financial:  Payor: MEDICARE / Plan: MEDICARE PART A AND B / Product Type: *No Product type* /      Pharmacy:    [facility] [id number]  [address] [place] [address]  Phone: [phone] Fax: [phone]      Assistance purchasing medications?: Potential Assistance Purchasing Medications: No  Assistance provided by Case Management: None at this time    Does patient want to participate in local refill/ meds to beds program?: Yes    Meds To Beds General Rules:  1. Can ONLY be done Monday- Friday between 8:30am-5pm  2. Prescription(s) must be in pharmacy by 3pm to be filled same day  3. Copy of patient's insurance/ prescription drug card and patient face sheet must be sent along with the prescription(s)  4. Cost of Rx cannot be added to hospital bill. If financial assistance is needed, please contact unit  or ;  or  CANNOT provide pharmacy voucher for patients co-pays  5.  Patients can then  the prescription on their way out of the hospital at discharge, or pharmacy can deliver to the bedside if staff is available. (payment due at time of pick-up or delivery - cash, check, or card accepted)     Able to afford home medications/ co-pay costs: Yes    ADLS:  Current PT AM-PAC Score: 17 /24  Current OT AM-PAC Score: 18 /24      DISCHARGE Disposition: Epi Dunlap (SNF): [facility] Phone: [phone] Fax: [phone]    LOC at discharge: Skilled  Sherita Yan Completed: Yes    Notification completed in HENS/PAS?:  Yes : CM has completed HENS online through secure website for SNF admission at [facility]. Document ID #: [id number]    IMM Completed:   Not Indicated    Transportation:  Transportation PLAN for discharge: family   Mode of Transport: Private Car  Reason for medical transport: Not Applicable  Name of [address] [place] [address],[address]: Not Applicable  Time of Transport: Via Sure[employer] form completed: Not Indicated    Home Care:  [address] ordered at discharge: Not [address]: Not Applicable  Orders faxed: No    Durable Medical Equipment:  DME Provider: n/aa  Equipment obtained during hospitalization: defer    Home Oxygen and Respiratory Equipment:  Oxygen needed at discharge?: Yes  [address]: [facility]  Phone: [phone]   Portable tank available for discharge?: Yes      Additional CM Notes:   Patient is discharging to SNF at [facility]. Orders faxed to [phone], nurse to call report to [phone]. Patient's nephew to transport home. Patient aware and agreeable to plan. The Plan for Transition of Care is related to the following treatment goals of Primary osteoarthritis of left knee [M17.12]  Osteoarthritis of left knee, unspecified osteoarthritis type [M17.12]    The Patient and/or patient representative Barry No and his family were provided with a choice of provider and agrees with the discharge plan Yes    Freedom of choice list was provided with basic dialogue that supports the patient's individualized plan of care/goals and shares the quality data associated with the providers.  Yes    Care Transitions patient: No    Silvestre Menjivar RN  [facility]  Case Management Department  Ph: [phone]  Fax: [phone]

## 2023-01-27 NOTE — PROGRESS NOTES
Report called at [facility]. All questions and concerns addressed at this time.  Patient to be picked up by family around 1:30 PM.

## 2023-01-27 NOTE — PLAN OF CARE
Problem: Discharge Planning  Goal: Discharge to home or other facility with appropriate resources  1/27/2023 0750 by Lyndsey Ayers RN  Outcome: Progressing     Problem: Pain  Goal: Verbalizes/displays adequate comfort level or baseline comfort level  1/27/2023 0750 by Lyndsey Ayers RN  Outcome: Progressing     Problem: Safety - Adult  Goal: Free from fall injury  1/27/2023 0750 by Lyndsey Ayers RN  Outcome: Progressing     Problem: Skin/Tissue Integrity  Goal: Absence of new skin breakdown  Description: 1. Monitor for areas of redness and/or skin breakdown  2. Assess vascular access sites hourly  3. Every 4-6 hours minimum:  Change oxygen saturation probe site  4. Every 4-6 hours:  If on nasal continuous positive airway pressure, respiratory therapy assess nares and determine need for appliance change or resting period.   1/27/2023 0750 by Lyndsey Ayers RN  Outcome: Progressing     Problem: ABCDS Injury Assessment  Goal: Absence of physical injury  Outcome: Progressing

## 2023-01-27 NOTE — PROGRESS NOTES
No acute changes this shift- pt to neurological baseline, VSS on 2-3L O2 via NC. Pain well controlled per MAR. Up x1 assist with GB and Walker, Ice to L knee as tolerated. Fall and safety precautions maintained.

## 2023-01-27 NOTE — PLAN OF CARE
Problem: Discharge Planning  Goal: Discharge to home or other facility with appropriate resources  1/27/2023 1219 by Jj Gross RN  Outcome: Completed  1/27/2023 0750 by Jj Gross RN  Outcome: Progressing  1/26/2023 2351 by Priyanka Milligan RN  Outcome: Progressing     Problem: Pain  Goal: Verbalizes/displays adequate comfort level or baseline comfort level  1/27/2023 1219 by Jj Gross RN  Outcome: Completed  1/27/2023 0750 by Jj Gross RN  Outcome: Progressing  1/26/2023 2351 by Priyanka Milligan RN  Outcome: Progressing     Problem: Safety - Adult  Goal: Free from fall injury  1/27/2023 1219 by Jj Gross RN  Outcome: Completed  1/27/2023 0750 by Jj Gross RN  Outcome: Progressing  1/26/2023 2351 by Priyanka Milligan RN  Outcome: Progressing     Problem: Skin/Tissue Integrity  Goal: Absence of new skin breakdown  Description: 1. Monitor for areas of redness and/or skin breakdown  2. Assess vascular access sites hourly  3. Every 4-6 hours minimum:  Change oxygen saturation probe site  4. Every 4-6 hours:  If on nasal continuous positive airway pressure, respiratory therapy assess nares and determine need for appliance change or resting period.   1/27/2023 1219 by Jj Gross RN  Outcome: Completed  1/27/2023 0750 by Jj Gross RN  Outcome: Progressing  1/26/2023 2351 by Priyanka Milligan RN  Outcome: Progressing     Problem: ABCDS Injury Assessment  Goal: Absence of physical injury  1/27/2023 1219 by Jj Gross RN  Outcome: Completed  1/27/2023 0750 by Jj Gross RN  Outcome: Progressing

## 2023-01-27 NOTE — PROGRESS NOTES
HOSPITALISTS PROGRESS NOTE    1/27/2023 6:23 PM        Name: Yesenia Nicole . Admitted: 1/24/2023  Primary Care Provider: Wilma Gutiérrez MD (Tel: [phone])      Problem List  Principal Problem:    Osteoarthritis of left knee, unspecified osteoarthritis type  Resolved Problems:    * No resolved hospital problems. *       Assessment & Plan:   Hypertension  On Norvasc 5 mg daily,  Stable, continue, monitor. Hyperlipidemia on Lipitor 20 mg nightly    COPD on Pulmicort and Brovana stable    Left knee arthritis s/p surgery  Oxycodone prn for pain, ASA 81 mg bid for DVT ppx. Senna prn for constipation       IV Access: Peripheral  Dowd: No  Diet: No diet orders on file  Code:Prior  DVT PPX recommend anticoagulation with heparin or Lovenox  Disposition in the hospital      Brief Course: S/p left knee arthroplasty      CC: Knee pain    Subjective:  . Patient is ready for discharge per primary service. Patient reports feeling well. Discussed fall prevention and bleeding risk while on aspirin. Reviewed interval ancillary notes    Current Medications  No current facility-administered medications for this encounter.       Objective:  /70   Pulse 81   Temp 98.8 °F (37.1 °C) (Oral)   Resp 18   Ht 5' 7\" (1.702 m)   Wt 246 lb 3.2 oz (111.7 kg)   SpO2 97%   BMI 38.56 kg/m²     Intake/Output Summary (Last 24 hours) at 1/27/2023 1823  Last data filed at 1/27/2023 1322  Gross per 24 hour   Intake 600 ml   Output --   Net 600 ml      Wt Readings from Last 3 Encounters:   01/24/23 246 lb 3.2 oz (111.7 kg)   02/18/22 270 lb (122.5 kg)   07/08/11 274 lb (124.3 kg)     Slightly obese  Lungs are clear to auscultation  S1-S2 heard  Abdomen is soft nontender nondistended  Left knee dressing noticed    Labs and Tests:  CBC:   Recent Labs     01/25/23  0639   HGB 11.2*     BMP:  No results for input(s): NA, K, CL, CO2, BUN, CREATININE, GLUCOSE in the last 72 hours. Hepatic: No results for input(s): AST, ALT, ALB, BILITOT, ALKPHOS in the last 72 hours. XR KNEE LEFT (1-2 VIEWS)   Final Result   1. Status post a left total knee arthroplasty.                 Josep Patino MD   1/27/2023 6:23 PM

## 2023-01-27 NOTE — PROGRESS NOTES
Patient alert and oriented x4. VSS on 2.5 L nasal canula. Silver dressing and tegaderm to left knee CD&I. X1 assist with gait belt and walker to bathroom. Voiding adequately. Tolerating PO intake. Denies nausea or vomiting. PRN oxycodone given for pain control and ice packs applied. Patient able to work with therapy and sat up in the chair. All fall precautions in place.

## 2023-01-27 NOTE — PROGRESS NOTES
Physical Therapy  Daily Treatment Note    Discharge Recommendations: Kendall Martinez scored a 17/24 on the AM-PAC short mobility form. Current research shows that an AM-PAC score of 17 or less is typically not associated with a discharge to the patient's home setting. Based on the patient's AM-PAC score and their current functional mobility deficits, it is recommended that the patient have 3-5 sessions per week of Physical Therapy at d/c to increase the patient's independence. Please see assessment section for further patient specific details. Assessment:  Pt continues to need CGA to Min assist for safe mobility at this time. Good effort and participation--pt seems very motivated to regain strength, function and independence. Limited by discomfort (neck and L knee), decreased strength, and fatigue. Pt from home alone and does not have 24 hour assist available. Would benefit from continued IP PT prior to returning home. Plan is for SNF. Equipment Needs: Defer  Other: Wheeled walker if D/[place] home     [facility]: LEVEL 3 SAFETY (If D/[place] home)  - Initial home health evaluation to occur within 24-48 hours, in patient home   - Therapy evaluations in home within 24-48 hours of discharge; including DME and home safety   - Frontload therapy 5 days, then 3x a week   - Therapy to evaluate if patient has [address] needs for personal care   -  evaluation within 24-48 hours, includes evaluation of resources and insurance to determine [place], [place], LTC, and Medicaid options     Chart Reviewed: Yes   Restrictions/Precautions: Fall Risk (high fall risk) Other position/activity restrictions: 1)  Ambulate in room progressing to hallway with assistive device four times daily (including PT) when PT advises. 2)  Bathroom privileges with assistance.   3) Up in bedside chair at least TID as tolerated; full WBAT; adult diet - regular   Additional Pertinent Hx: left TKA 1/24/23; PMH: HTN, COPD, GERD, peptic ulcer, sleep apnea      Diagnosis: left TKA   Treatment Diagnosis: impaired functional mobility    Subjective: Pt in chair initially. Agreeable to working with PT. States he slept pretty well last night. \"I got about 4 hours. Which is good for me. \"    Pain: 8/10 at start of session (R knee and neck). 7/10 end of session. Reports he was medicated recently. Ice packs to knee after therapy. Objective:    Bed mobility  Supine to sit: SBA, HOB up, with use of rail. Effortful for trunk. Sit to Supine: CGA, HOB up, without railing. Effortful for R LE. Transfers  Sit to stand: CGA from recliner; Min assist from standard chair; CGA from bed  Stand to sit: CGA into chair (twice); CGA onto bed. Decreased eccentric control  Bed <> chair: CGA with wheeled walker  Other: Demonstrating safe hand placement with transfers when using walker. Ambulation  Assistance Level: [facility]  Assistive device: Wheeled walker  Distance: 70 ft, 90 ft. Lengthy seated rest between walks. Quality of gait: Step-to pattern; decreased pace; decreased step length/height; decreased L knee flexion in swing phase; no LOB; antalgic L LE    Exercises  12 reps B ankle pumps, quad sets, glut sets, L hip abd/add, SAQ, heel slides   10 reps L SLR with min (initially) to mod assist    ROM  10-75 degrees L knee    Other  Pt on 2L oxygen throughout session. Patient Education  Calling for assist with needs. Expressed understanding. Safety Devices  Pt left with needs in reach. In chair (reclined) with chair alarm on. RN aware.      AM-PAC score  AM-PAC Inpatient Mobility Raw Score : 17  AM-PAC Inpatient T-Scale Score : 42.13  Mobility Inpatient CMS 0-100% Score: 50.57  Mobility Inpatient CMS G-Code Modifier : CK    Goals: (as determined and assessed by primary PT)  Time Frame for Short Term Goals: discharge - all goals ongoing 1/25  Short Term Goal 1: Pt. will demonstrate modified (I) bed mobility   Short Term Goal 2: Pt. will demonstrate sit <-> stand modified (I) with LRAD   Short Term Goal 3: Pt. will demonstrate stand pivot modified (I) with LRAD  Short Term Goal 4: Pt. will ambulate >/= 150ft with LRAD and (S)  Short Term Goal 5: Pt. will demonstrate 0 to 90 degrees left knee flexion    Plan:  Plan: 2 times a day 7 days a week    Current Treatment Recommendations: Strengthening, ROM, Balance training, Functional mobility training, Endurance training, Gait training, Pain management, Home exercise program, Safety education & training, Patient/Caregiver education & training, Equipment evaluation, education, & procurement, Therapeutic activities    Therapy Time    Individual  Concurrent  Group  Co-treatment    Time In  840            Time Out  925            Minutes  45              Timed Code Treatment Minutes: 45  Total Treatment Minutes: 45    Will continue per plan of care. If patient is discharged prior to next treatment, this note will serve as the discharge summary.     Edilberto Castro #[id number]

## 2023-01-27 NOTE — PROGRESS NOTES
Occupational Therapy  Daily Treatment Note  Patient Name: Kwasi Griffith  MRN: [id number]    Assessment: Pt participated actively in [facility], practicing donning/doffing footwear with AE + toileting. Plan is for d/c to SNF today for continued therapy to increase independence. Discharge Recommendations: Kwasi Griffith scored a 18/24 on the AM-PAC ADL Inpatient form. Current research shows that an AM-PAC score of 17 or less is typically not associated with a discharge to the patient's home setting. Based on the patient's AM-PAC score and their current ADL deficits, it is recommended that the patient have 3-5 sessions per week of Occupational Therapy at d/c to increase the patient's independence. Please see assessment section for further patient specific details. Equipment Needs:  No    Chart Reviewed: Yes   Restrictions/Precautions: Fall Risk (high fall risk) Other position/activity restrictions: 1)  Ambulate in room progressing to hallway with assistive device four times daily (including PT) when PT advises. 2)  Bathroom privileges with assistance. 3) Up in bedside chair at least TID as tolerated; full WBAT; adult diet - regular   Additional Pertinent Hx: left TKA 1/24/23; PMH: HTN, COPD, GERD, peptic ulcer, sleep apnea    Diagnosis: OA L knee  Treatment Diagnosis: impaired mobility, transfers, ADL    Subjective: Pt in chair on entry. Very pleasant and cooperative. Determined to maintain his independence. Pain: Yes, chronic neck pain + surgical knee pain, \"tolerable,\" ice packs reapplied after session    Objective:    Cognition/Orientation: WNL    Functional Mobility   Sit to Stand: CGA  Stand to Sit: CGA  Chair Transfer: CGA  Commode Transfer: CGA  Other: Functional mobility to/from/within bathroom CGA with rolling walker. Stance at sink for hand hygiene SBA.      ADLs   Grooming: Independent hand hygiene with SBA for stance at sink  LB dressing: Min assist (pt worked on donning/doffing compression stockings and socks. [facility]. Doffed socks with reacher and donned pt owned stockings with sock aid. Pt requires significantly increased time and effort. Educated on strategies - verb understanding. Toileting: CGA (attempted but unable to void. Notes family has obtained a 3-in-1 commode for him)    Activity Tolerance: on RA on entry, NC pulled down. spO2 82%; pt able to increase to 86% but no higher with pursed lip breathing. 2L O2 worn for remainder of session with spO2 96% after ambulation. Patient Education: ADLs, activity promotion, d/c planning - verb understanding    Safety Devices in Place: left in chair as found with alarm on and all needs in reach. No pertinent info to relay to RN. Goals:  Short Term Goals  Time Frame for Short Term Goals: dc  Short Term Goal 1: supine to sit with SBA - goal met 1/26  Short Term Goal 2: sit<>stand with SBA - SBA/CGA continue goal 1/26  Short Term Goal 3: Fx mobility with SBA - SBA/CGA continue goal 1/26  Short Term Goal 4: UB/LB dressing SBA + AE - Not met 1/26  Short Term Goal 5: toileting with SPVN - Not met 1/26         Plan:      Times Per Week: 7        If patient is discharged prior to next treatment, this note will serve as the discharge summary.     Therapy Time   Individual Concurrent Group Co-treatment   Time In 1023         Time Out 1119         Minutes 56            Timed Code Treatment Minutes: 64  Total Treatment Time:56       Zahra Dumont, OT

## 2023-01-27 NOTE — PLAN OF CARE
Problem: Discharge Planning  Goal: Discharge to home or other facility with appropriate resources  1/26/2023 2351 by Asad Gonzalez RN  Outcome: Progressing     Problem: Pain  Goal: Verbalizes/displays adequate comfort level or baseline comfort level  Outcome: Progressing     Problem: Safety - Adult  Goal: Free from fall injury  1/26/2023 2351 by Asad Gonzalez RN  Outcome: Progressing     Problem: Skin/Tissue Integrity  Goal: Absence of new skin breakdown  Description: 1. Monitor for areas of redness and/or skin breakdown  2. Assess vascular access sites hourly  3. Every 4-6 hours minimum:  Change oxygen saturation probe site  4. Every 4-6 hours:  If on nasal continuous positive airway pressure, respiratory therapy assess nares and determine need for appliance change or resting period.   1/26/2023 2351 by Asad Gonzalez RN  Outcome: Progressing

## 2023-02-01 ENCOUNTER — CARE COORDINATION (OUTPATIENT)
Dept: CASE MANAGEMENT | Age: 73
End: 2023-02-01

## 2023-02-08 ENCOUNTER — CARE COORDINATION (OUTPATIENT)
Dept: CASE MANAGEMENT | Age: 73
End: 2023-02-08

## 2023-02-08 NOTE — CARE COORDINATION
Pt is participating in [facility] program. Call placed to [facility]. Requested to speak with Kathryn Mercer but she was not in. Initially spoke with Marya Newman, who states pt has no discharge plan at this time. Was transferred to nurse Wells who states pt is doing well. Denies any incisional issues, pain control issues or s/s of infection. Pt working with therapy and \"doing really well. \" Denies any concerns at this time.      EDWARD WellerN, RN   Care Transition Nurse  Mobile: [phone]

## 2023-02-16 ENCOUNTER — CARE COORDINATION (OUTPATIENT)
Dept: CASE MANAGEMENT | Age: 73
End: 2023-02-16

## 2023-02-16 NOTE — CARE COORDINATION
[facility] for patient updates with [address] [place], [place] [address]kailash program. Left message for VINCENT Gruber for return call. Received return call from [place]. Ext #[phone] stating that patient's incision is free from redness or drainage. States participating in therapy and plans to discharge with [facility] on 2/24/23.     Roxie LEONN  Care Transition Nurse for ortho bundle  [phone]

## 2023-02-21 ENCOUNTER — CARE COORDINATION (OUTPATIENT)
Dept: CASE MANAGEMENT | Age: 73
End: 2023-02-21

## 2023-02-21 NOTE — CARE COORDINATION
[facility] for patient updates with VINCENT Haas states that patient discharged and went home with [facility]. Called patient at home to follow up. Left message for return call.   Checo LEONN  Care Transition Nurse for ortho bundle  [phone]

## 2023-02-28 ENCOUNTER — CARE COORDINATION (OUTPATIENT)
Dept: CASE MANAGEMENT | Age: 73
End: 2023-02-28

## 2023-02-28 NOTE — CARE COORDINATION
Called patient for follow up and updates with  [facility]. Left message for return call.   Alejandro Tineo BSN  Care Transition Nurse for ortho bundle  [phone]

## 2023-03-08 ENCOUNTER — CARE COORDINATION (OUTPATIENT)
Dept: CASE MANAGEMENT | Age: 73
End: 2023-03-08

## 2023-03-08 NOTE — CARE COORDINATION
Called patient for follow up with  [facility]. Left message for return call.   Marian LEONN  Care Transition Nurse for ortho bundle  [phone]

## 2023-03-15 ENCOUNTER — CARE COORDINATION (OUTPATIENT)
Dept: CASE MANAGEMENT | Age: 73
End: 2023-03-15

## 2023-03-15 NOTE — CARE COORDINATION
[facility] Left message for return call.   Arleen Shaw BSN  Care Transition Nurse for ortho bundle  [phone]

## 2023-03-29 ENCOUNTER — CARE COORDINATION (OUTPATIENT)
Dept: CASE MANAGEMENT | Age: 73
End: 2023-03-29

## 2023-03-29 NOTE — CARE COORDINATION
[facility]. Left message for return call.   Bjorn LEONN  Care Transition Nurse for ortho bundle  [phone]

## 2023-04-19 ENCOUNTER — CARE COORDINATION (OUTPATIENT)
Dept: CASE MANAGEMENT | Age: 73
End: 2023-04-19

## 2023-04-19 NOTE — CARE COORDINATION
Called patient for follow up with  [facility] program. Left message that bundle program and follow up phone calls are ending. Left message to follow up with Dr. Lian Hair for any complications/concerns.   Debi Patricio BSN  Care Transition Nurse for ortho bundle  [phone]

## (undated) DEVICE — BIPOLAR SEALER 23-112-1 AQM 6.0: Brand: AQUAMANTYS ®

## (undated) DEVICE — SUTURE VCRL SZ 1 L18IN ABSRB UD L36MM CT-1 1/2 CIR J841D

## (undated) DEVICE — ELECTRODE PT RET AD L9FT HI MOIST COND ADH HYDRGEL CORDED

## (undated) DEVICE — CANNULA NSL AD TBNG L7FT PVC STR NONFLARED PRNG O2 DEL W STD

## (undated) DEVICE — DUAL CUT SAGITTAL BLADE

## (undated) DEVICE — 3M™ COBAN™ NL STERILE NON-LATEX SELF-ADHERENT WRAP, 2086S, 6 IN X 5 YD (15 CM X 4,5 M), 12 ROLLS/CASE: Brand: 3M™ COBAN™

## (undated) DEVICE — SOLUTION IRRIG 3000ML 0.9% SOD CHL USP UROMATIC PLAS CONT

## (undated) DEVICE — GLOVE SURG SZ 85 L12IN FNGR ORTHO 126MIL CRM LTX FREE

## (undated) DEVICE — TOWEL,OR,DSP,ST,BLUE,DLX,8/PK,10PK/CS: Brand: MEDLINE

## (undated) DEVICE — SST BUR, WIRE PASS DRILL, 2 FLUTES, MED., 1.5MM DIA: Brand: MICROAIRE®

## (undated) DEVICE — SUTURE STRATAFIX SPRL SZ 1 L14IN ABSRB VLT L48CM CTX 1/2 SXPD2B405

## (undated) DEVICE — TOTAL KNEE: Brand: MEDLINE INDUSTRIES, INC.

## (undated) DEVICE — 2108 SERIES SAGITTAL BLADE FAN, OFFSET  (29.0 X 0.89 X 73.0MM)

## (undated) DEVICE — SUTURE VCRL SZ 2-0 L18IN ABSRB UD CT-1 L36MM 1/2 CIR J839D

## (undated) DEVICE — SOLUTION IV 1000ML 0.9% SOD CHL

## (undated) DEVICE — SUTURE VCRL SZ 0 L18IN ABSRB UD L36MM CT-1 1/2 CIR J840D

## (undated) DEVICE — ENDOSCOPIC KIT 2 12 FT OP4 DE2 GWN SYR

## (undated) DEVICE — ELECTRODE ECG MONITR FOAM TEAR DROP ADLT RED

## (undated) DEVICE — ZIMMER® STERILE DISPOSABLE TOURNIQUET CUFF WITH PLC, DUAL PORT, SINGLE BLADDER, 30 IN. (76 CM)

## (undated) DEVICE — UNDERGLOVE SURG SZ 8.5 FNGR THK0.21MIL GRN LTX BEAD CUF

## (undated) DEVICE — CONMED SCOPE SAVER BITE BLOCK, 20X27 MM: Brand: SCOPE SAVER

## (undated) DEVICE — SOLUTION IV IRRIG WATER 1000ML POUR BRL 2F7114

## (undated) DEVICE — BOWL AND CEMENT CARTRIDGE WITH BREAKAWAY FEMORAL NOZZLE AND MEDIUM PRESSURIZER: Brand: ACM

## (undated) DEVICE — SUTURE MCRYL SZ 4-0 L27IN ABSRB UD L19MM PS-2 1/2 CIR PRIM Y426H

## (undated) DEVICE — APPLICATOR PREP 26ML 0.7% IOD POVACRYLEX 74% ISO ALC ST

## (undated) DEVICE — SYSTEM SKIN CLSR 22CM DERMBND PRINEO

## (undated) DEVICE — BLADE SAW RECIP HVY DUTY LNG 27796327] STRYKER CORP]

## (undated) DEVICE — SOLUTION IV 100ML 0.9% SOD CHL PLAS CONT USP VIAFLX 1 PER

## (undated) DEVICE — KNEE HOLDER DISPOSABLE LINER: Brand: ALVARADO®  KNEE SUPPORT

## (undated) DEVICE — BLANKET WRM W29.9XL79.1IN UP BODY FORC AIR MISTRAL-AIR

## (undated) DEVICE — 3M™ IOBAN™ 2 ANTIMICROBIAL INCISE DRAPE 6640EZ: Brand: IOBAN™ 2

## (undated) DEVICE — PADDING CAST N ADH 12X6 IN CRIMPED FINISH 100% COTTON WBRLII

## (undated) DEVICE — DRESSING WND ISL THERABOND 4X8IN

## (undated) DEVICE — TOWEL,STOP FLAG GOLD N-W: Brand: MEDLINE

## (undated) DEVICE — BLADE SAW RECIP HVY DUTY LNG 0277096327] STRYKER CORP]

## (undated) DEVICE — STERILE PVP: Brand: MEDLINE INDUSTRIES, INC.